# Patient Record
Sex: MALE | Race: WHITE | Employment: OTHER | URBAN - METROPOLITAN AREA
[De-identification: names, ages, dates, MRNs, and addresses within clinical notes are randomized per-mention and may not be internally consistent; named-entity substitution may affect disease eponyms.]

---

## 2017-04-12 PROBLEM — N28.89 RENAL MASS: Status: ACTIVE | Noted: 2017-04-12

## 2017-05-19 ENCOUNTER — ANESTHESIA EVENT (OUTPATIENT)
Dept: SURGERY | Age: 57
DRG: 657 | End: 2017-05-19
Payer: COMMERCIAL

## 2017-05-22 ENCOUNTER — ANESTHESIA (OUTPATIENT)
Dept: SURGERY | Age: 57
DRG: 657 | End: 2017-05-22
Payer: COMMERCIAL

## 2017-05-22 ENCOUNTER — HOSPITAL ENCOUNTER (INPATIENT)
Age: 57
LOS: 4 days | Discharge: HOME | DRG: 657 | End: 2017-05-26
Attending: UROLOGY | Admitting: UROLOGY
Payer: COMMERCIAL

## 2017-05-22 LAB
ABO + RH BLD: NORMAL
BLOOD GROUP ANTIBODIES SERPL: NORMAL
ERYTHROCYTE [DISTWIDTH] IN BLOOD BY AUTOMATED COUNT: 13.7 % (ref 11.6–14.5)
HCT VFR BLD AUTO: 46.9 % (ref 36–48)
HGB BLD-MCNC: 15.8 G/DL (ref 13–16)
MCH RBC QN AUTO: 29.2 PG (ref 24–34)
MCHC RBC AUTO-ENTMCNC: 33.7 G/DL (ref 31–37)
MCV RBC AUTO: 86.5 FL (ref 74–97)
PLATELET # BLD AUTO: 240 K/UL (ref 135–420)
PMV BLD AUTO: 9.8 FL (ref 9.2–11.8)
RBC # BLD AUTO: 5.42 M/UL (ref 4.7–5.5)
SPECIMEN EXP DATE BLD: NORMAL
WBC # BLD AUTO: 12.4 K/UL (ref 4.6–13.2)

## 2017-05-22 PROCEDURE — 0TT14ZZ RESECTION OF LEFT KIDNEY, PERCUTANEOUS ENDOSCOPIC APPROACH: ICD-10-PCS | Performed by: UROLOGY

## 2017-05-22 PROCEDURE — 76010000881 HC OR TIME 4.5 TO 5HR INTENSV - TIER 2: Performed by: UROLOGY

## 2017-05-22 PROCEDURE — 77030036732 HC RELD STPLR VASC J&J -F: Performed by: UROLOGY

## 2017-05-22 PROCEDURE — 77030010935 HC CLP LIG ABSRB TELE -B: Performed by: UROLOGY

## 2017-05-22 PROCEDURE — 74011250636 HC RX REV CODE- 250/636: Performed by: NURSE ANESTHETIST, CERTIFIED REGISTERED

## 2017-05-22 PROCEDURE — 85027 COMPLETE CBC AUTOMATED: CPT | Performed by: UROLOGY

## 2017-05-22 PROCEDURE — 77030010507 HC ADH SKN DERMBND J&J -B: Performed by: UROLOGY

## 2017-05-22 PROCEDURE — 77030032490 HC SLV COMPR SCD KNE COVD -B: Performed by: UROLOGY

## 2017-05-22 PROCEDURE — 74011250636 HC RX REV CODE- 250/636: Performed by: ANESTHESIOLOGY

## 2017-05-22 PROCEDURE — 74011000250 HC RX REV CODE- 250: Performed by: UROLOGY

## 2017-05-22 PROCEDURE — 77030007956 HC PCH ENDOSC SPEC COVD -C: Performed by: UROLOGY

## 2017-05-22 PROCEDURE — 0TJB8ZZ INSPECTION OF BLADDER, VIA NATURAL OR ARTIFICIAL OPENING ENDOSCOPIC: ICD-10-PCS | Performed by: UROLOGY

## 2017-05-22 PROCEDURE — 77030008462 HC STPLR SKN PROX J&J -A: Performed by: UROLOGY

## 2017-05-22 PROCEDURE — 77030031139 HC SUT VCRL2 J&J -A: Performed by: UROLOGY

## 2017-05-22 PROCEDURE — 77030010517 HC APPL SEAL FLOSEL BAXT -B: Performed by: UROLOGY

## 2017-05-22 PROCEDURE — 74011250637 HC RX REV CODE- 250/637

## 2017-05-22 PROCEDURE — 74011000258 HC RX REV CODE- 258: Performed by: UROLOGY

## 2017-05-22 PROCEDURE — 77030008683 HC TU ET CUF COVD -A: Performed by: ANESTHESIOLOGY

## 2017-05-22 PROCEDURE — 76060000040 HC ANESTHESIA 4.5 TO 5 HR: Performed by: UROLOGY

## 2017-05-22 PROCEDURE — 36415 COLL VENOUS BLD VENIPUNCTURE: CPT | Performed by: UROLOGY

## 2017-05-22 PROCEDURE — 77030008602 HC TRCR ENDOSC EPATH J&J -B: Performed by: UROLOGY

## 2017-05-22 PROCEDURE — 77030018842 HC SOL IRR SOD CL 9% BAXT -A: Performed by: UROLOGY

## 2017-05-22 PROCEDURE — 76210000016 HC OR PH I REC 1 TO 1.5 HR: Performed by: UROLOGY

## 2017-05-22 PROCEDURE — 74011250636 HC RX REV CODE- 250/636: Performed by: UROLOGY

## 2017-05-22 PROCEDURE — 88307 TISSUE EXAM BY PATHOLOGIST: CPT | Performed by: UROLOGY

## 2017-05-22 PROCEDURE — 74011250637 HC RX REV CODE- 250/637: Performed by: UROLOGY

## 2017-05-22 PROCEDURE — 77030002996 HC SUT SLK J&J -A: Performed by: UROLOGY

## 2017-05-22 PROCEDURE — 77030002896 HC SUT CLP ABSRB J&J -B: Performed by: UROLOGY

## 2017-05-22 PROCEDURE — 74011250636 HC RX REV CODE- 250/636

## 2017-05-22 PROCEDURE — 77030016151 HC PROTCTR LNS DFOG COVD -B: Performed by: UROLOGY

## 2017-05-22 PROCEDURE — 77030009848 HC PASSR SUT SET COOP -C: Performed by: UROLOGY

## 2017-05-22 PROCEDURE — 74011000250 HC RX REV CODE- 250

## 2017-05-22 PROCEDURE — 77030010939 HC CLP LIG TELE -B: Performed by: UROLOGY

## 2017-05-22 PROCEDURE — 77030008771 HC TU NG SALEM SUMP -A: Performed by: ANESTHESIOLOGY

## 2017-05-22 PROCEDURE — 77030035048 HC TRCR ENDOSC OPTCL COVD -B: Performed by: UROLOGY

## 2017-05-22 PROCEDURE — 77030002933 HC SUT MCRYL J&J -A: Performed by: UROLOGY

## 2017-05-22 PROCEDURE — 77030002966 HC SUT PDS J&J -A: Performed by: UROLOGY

## 2017-05-22 PROCEDURE — 86900 BLOOD TYPING SEROLOGIC ABO: CPT

## 2017-05-22 PROCEDURE — 77030019908 HC STETH ESOPH SIMS -A: Performed by: ANESTHESIOLOGY

## 2017-05-22 PROCEDURE — 77030013079 HC BLNKT BAIR HGGR 3M -A: Performed by: ANESTHESIOLOGY

## 2017-05-22 PROCEDURE — 77030011640 HC PAD GRND REM COVD -A: Performed by: UROLOGY

## 2017-05-22 PROCEDURE — 77030027138 HC INCENT SPIROMETER -A

## 2017-05-22 PROCEDURE — 77030012022 HC APPL CLP ENDOSC COVD -C: Performed by: UROLOGY

## 2017-05-22 PROCEDURE — 77030014647 HC SEAL FBRN TISSL BAXT -D: Performed by: UROLOGY

## 2017-05-22 PROCEDURE — 77030035488 HC SEAL UNIV DISP INTU -C: Performed by: UROLOGY

## 2017-05-22 PROCEDURE — 77030035277 HC OBTRTR BLDELSS DISP INTU -B: Performed by: UROLOGY

## 2017-05-22 PROCEDURE — 77030034850: Performed by: UROLOGY

## 2017-05-22 PROCEDURE — 65270000029 HC RM PRIVATE

## 2017-05-22 PROCEDURE — 77030020263 HC SOL INJ SOD CL0.9% LFCR 1000ML

## 2017-05-22 PROCEDURE — 77030021678 HC GLIDESCP STAT DISP VERT -B: Performed by: ANESTHESIOLOGY

## 2017-05-22 RX ORDER — HEPARIN SODIUM 5000 [USP'U]/ML
5000 INJECTION, SOLUTION INTRAVENOUS; SUBCUTANEOUS ONCE
Status: COMPLETED | OUTPATIENT
Start: 2017-05-22 | End: 2017-05-22

## 2017-05-22 RX ORDER — DOCUSATE SODIUM 100 MG/1
100 CAPSULE, LIQUID FILLED ORAL 3 TIMES DAILY
Status: DISCONTINUED | OUTPATIENT
Start: 2017-05-22 | End: 2017-05-26 | Stop reason: HOSPADM

## 2017-05-22 RX ORDER — SODIUM CHLORIDE, SODIUM LACTATE, POTASSIUM CHLORIDE, CALCIUM CHLORIDE 600; 310; 30; 20 MG/100ML; MG/100ML; MG/100ML; MG/100ML
50 INJECTION, SOLUTION INTRAVENOUS CONTINUOUS
Status: DISCONTINUED | OUTPATIENT
Start: 2017-05-22 | End: 2017-05-22 | Stop reason: HOSPADM

## 2017-05-22 RX ORDER — HYDROMORPHONE HYDROCHLORIDE 2 MG/ML
0.5 INJECTION, SOLUTION INTRAMUSCULAR; INTRAVENOUS; SUBCUTANEOUS
Status: DISCONTINUED | OUTPATIENT
Start: 2017-05-22 | End: 2017-05-22 | Stop reason: HOSPADM

## 2017-05-22 RX ORDER — SODIUM CHLORIDE, SODIUM LACTATE, POTASSIUM CHLORIDE, CALCIUM CHLORIDE 600; 310; 30; 20 MG/100ML; MG/100ML; MG/100ML; MG/100ML
INJECTION, SOLUTION INTRAVENOUS
Status: DISCONTINUED | OUTPATIENT
Start: 2017-05-22 | End: 2017-05-22 | Stop reason: HOSPADM

## 2017-05-22 RX ORDER — METOPROLOL TARTRATE 5 MG/5ML
INJECTION INTRAVENOUS AS NEEDED
Status: DISCONTINUED | OUTPATIENT
Start: 2017-05-22 | End: 2017-05-22 | Stop reason: HOSPADM

## 2017-05-22 RX ORDER — NALOXONE HYDROCHLORIDE 0.4 MG/ML
0.4 INJECTION, SOLUTION INTRAMUSCULAR; INTRAVENOUS; SUBCUTANEOUS AS NEEDED
Status: DISCONTINUED | OUTPATIENT
Start: 2017-05-22 | End: 2017-05-26 | Stop reason: HOSPADM

## 2017-05-22 RX ORDER — ROCURONIUM BROMIDE 10 MG/ML
INJECTION, SOLUTION INTRAVENOUS AS NEEDED
Status: DISCONTINUED | OUTPATIENT
Start: 2017-05-22 | End: 2017-05-22 | Stop reason: HOSPADM

## 2017-05-22 RX ORDER — SODIUM CHLORIDE 9 MG/ML
125 INJECTION, SOLUTION INTRAVENOUS CONTINUOUS
Status: DISCONTINUED | OUTPATIENT
Start: 2017-05-22 | End: 2017-05-25

## 2017-05-22 RX ORDER — PROPOFOL 10 MG/ML
INJECTION, EMULSION INTRAVENOUS AS NEEDED
Status: DISCONTINUED | OUTPATIENT
Start: 2017-05-22 | End: 2017-05-22 | Stop reason: HOSPADM

## 2017-05-22 RX ORDER — NEOSTIGMINE METHYLSULFATE 5 MG/5 ML
SYRINGE (ML) INTRAVENOUS AS NEEDED
Status: DISCONTINUED | OUTPATIENT
Start: 2017-05-22 | End: 2017-05-22 | Stop reason: HOSPADM

## 2017-05-22 RX ORDER — LABETALOL HCL 20 MG/4 ML
SYRINGE (ML) INTRAVENOUS AS NEEDED
Status: DISCONTINUED | OUTPATIENT
Start: 2017-05-22 | End: 2017-05-22 | Stop reason: HOSPADM

## 2017-05-22 RX ORDER — ACETAMINOPHEN 325 MG/1
650 TABLET ORAL
COMMUNITY
End: 2017-09-26

## 2017-05-22 RX ORDER — DOCUSATE SODIUM 100 MG/1
100 CAPSULE, LIQUID FILLED ORAL 2 TIMES DAILY
Qty: 30 CAP | Refills: 0 | Status: SHIPPED | OUTPATIENT
Start: 2017-05-22 | End: 2017-08-20

## 2017-05-22 RX ORDER — FENTANYL CITRATE 50 UG/ML
50 INJECTION, SOLUTION INTRAMUSCULAR; INTRAVENOUS AS NEEDED
Status: DISCONTINUED | OUTPATIENT
Start: 2017-05-22 | End: 2017-05-22 | Stop reason: HOSPADM

## 2017-05-22 RX ORDER — LIDOCAINE HYDROCHLORIDE 20 MG/ML
INJECTION, SOLUTION EPIDURAL; INFILTRATION; INTRACAUDAL; PERINEURAL AS NEEDED
Status: DISCONTINUED | OUTPATIENT
Start: 2017-05-22 | End: 2017-05-22 | Stop reason: HOSPADM

## 2017-05-22 RX ORDER — HEPARIN SODIUM 5000 [USP'U]/ML
5000 INJECTION, SOLUTION INTRAVENOUS; SUBCUTANEOUS EVERY 8 HOURS
Status: DISCONTINUED | OUTPATIENT
Start: 2017-05-22 | End: 2017-05-26 | Stop reason: HOSPADM

## 2017-05-22 RX ORDER — FAMOTIDINE 20 MG/1
TABLET, FILM COATED ORAL
Status: COMPLETED
Start: 2017-05-22 | End: 2017-05-22

## 2017-05-22 RX ORDER — ZOLPIDEM TARTRATE 5 MG/1
5 TABLET ORAL
Status: DISCONTINUED | OUTPATIENT
Start: 2017-05-22 | End: 2017-05-26 | Stop reason: HOSPADM

## 2017-05-22 RX ORDER — SODIUM CHLORIDE 0.9 % (FLUSH) 0.9 %
5-10 SYRINGE (ML) INJECTION AS NEEDED
Status: DISCONTINUED | OUTPATIENT
Start: 2017-05-22 | End: 2017-05-26 | Stop reason: HOSPADM

## 2017-05-22 RX ORDER — SODIUM CHLORIDE 0.9 % (FLUSH) 0.9 %
5-10 SYRINGE (ML) INJECTION EVERY 8 HOURS
Status: DISCONTINUED | OUTPATIENT
Start: 2017-05-22 | End: 2017-05-26 | Stop reason: HOSPADM

## 2017-05-22 RX ORDER — BUPIVACAINE HYDROCHLORIDE 2.5 MG/ML
INJECTION, SOLUTION EPIDURAL; INFILTRATION; INTRACAUDAL AS NEEDED
Status: DISCONTINUED | OUTPATIENT
Start: 2017-05-22 | End: 2017-05-22 | Stop reason: HOSPADM

## 2017-05-22 RX ORDER — SODIUM CHLORIDE 0.9 % (FLUSH) 0.9 %
5-10 SYRINGE (ML) INJECTION EVERY 8 HOURS
Status: DISCONTINUED | OUTPATIENT
Start: 2017-05-22 | End: 2017-05-22 | Stop reason: HOSPADM

## 2017-05-22 RX ORDER — FENTANYL CITRATE 50 UG/ML
INJECTION, SOLUTION INTRAMUSCULAR; INTRAVENOUS AS NEEDED
Status: DISCONTINUED | OUTPATIENT
Start: 2017-05-22 | End: 2017-05-22 | Stop reason: HOSPADM

## 2017-05-22 RX ORDER — OXYCODONE AND ACETAMINOPHEN 5; 325 MG/1; MG/1
1-2 TABLET ORAL
Qty: 30 TAB | Refills: 0 | Status: SHIPPED | OUTPATIENT
Start: 2017-05-22 | End: 2017-09-26

## 2017-05-22 RX ORDER — SODIUM CHLORIDE, SODIUM LACTATE, POTASSIUM CHLORIDE, CALCIUM CHLORIDE 600; 310; 30; 20 MG/100ML; MG/100ML; MG/100ML; MG/100ML
50 INJECTION, SOLUTION INTRAVENOUS CONTINUOUS
Status: DISCONTINUED | OUTPATIENT
Start: 2017-05-23 | End: 2017-05-22 | Stop reason: HOSPADM

## 2017-05-22 RX ORDER — FAMOTIDINE 20 MG/1
20 TABLET, FILM COATED ORAL ONCE
Status: COMPLETED | OUTPATIENT
Start: 2017-05-23 | End: 2017-05-22

## 2017-05-22 RX ORDER — HYDROMORPHONE HYDROCHLORIDE 1 MG/ML
INJECTION, SOLUTION INTRAMUSCULAR; INTRAVENOUS; SUBCUTANEOUS AS NEEDED
Status: DISCONTINUED | OUTPATIENT
Start: 2017-05-22 | End: 2017-05-22 | Stop reason: HOSPADM

## 2017-05-22 RX ORDER — ONDANSETRON 2 MG/ML
INJECTION INTRAMUSCULAR; INTRAVENOUS AS NEEDED
Status: DISCONTINUED | OUTPATIENT
Start: 2017-05-22 | End: 2017-05-22 | Stop reason: HOSPADM

## 2017-05-22 RX ORDER — ONDANSETRON 2 MG/ML
4 INJECTION INTRAMUSCULAR; INTRAVENOUS ONCE
Status: DISCONTINUED | OUTPATIENT
Start: 2017-05-22 | End: 2017-05-22 | Stop reason: HOSPADM

## 2017-05-22 RX ORDER — SUCCINYLCHOLINE CHLORIDE 20 MG/ML
INJECTION INTRAMUSCULAR; INTRAVENOUS AS NEEDED
Status: DISCONTINUED | OUTPATIENT
Start: 2017-05-22 | End: 2017-05-22 | Stop reason: HOSPADM

## 2017-05-22 RX ORDER — ACETAMINOPHEN 325 MG/1
650 TABLET ORAL
Status: DISCONTINUED | OUTPATIENT
Start: 2017-05-22 | End: 2017-05-26 | Stop reason: HOSPADM

## 2017-05-22 RX ORDER — DIPHENHYDRAMINE HYDROCHLORIDE 50 MG/ML
12.5 INJECTION, SOLUTION INTRAMUSCULAR; INTRAVENOUS
Status: DISCONTINUED | OUTPATIENT
Start: 2017-05-22 | End: 2017-05-26 | Stop reason: HOSPADM

## 2017-05-22 RX ORDER — MORPHINE SULFATE 10 MG/ML
4 INJECTION, SOLUTION INTRAMUSCULAR; INTRAVENOUS
Status: DISCONTINUED | OUTPATIENT
Start: 2017-05-22 | End: 2017-05-26 | Stop reason: HOSPADM

## 2017-05-22 RX ORDER — ESMOLOL HYDROCHLORIDE 10 MG/ML
INJECTION INTRAVENOUS AS NEEDED
Status: DISCONTINUED | OUTPATIENT
Start: 2017-05-22 | End: 2017-05-22 | Stop reason: HOSPADM

## 2017-05-22 RX ORDER — SODIUM CHLORIDE 0.9 % (FLUSH) 0.9 %
5-10 SYRINGE (ML) INJECTION AS NEEDED
Status: DISCONTINUED | OUTPATIENT
Start: 2017-05-22 | End: 2017-05-22 | Stop reason: HOSPADM

## 2017-05-22 RX ORDER — ONDANSETRON 2 MG/ML
4 INJECTION INTRAMUSCULAR; INTRAVENOUS
Status: COMPLETED | OUTPATIENT
Start: 2017-05-22 | End: 2017-05-22

## 2017-05-22 RX ORDER — GLYCOPYRROLATE 0.2 MG/ML
INJECTION INTRAMUSCULAR; INTRAVENOUS AS NEEDED
Status: DISCONTINUED | OUTPATIENT
Start: 2017-05-22 | End: 2017-05-22 | Stop reason: HOSPADM

## 2017-05-22 RX ORDER — ONDANSETRON 2 MG/ML
4 INJECTION INTRAMUSCULAR; INTRAVENOUS
Status: DISCONTINUED | OUTPATIENT
Start: 2017-05-22 | End: 2017-05-26 | Stop reason: HOSPADM

## 2017-05-22 RX ORDER — OXYCODONE AND ACETAMINOPHEN 5; 325 MG/1; MG/1
1-2 TABLET ORAL
Status: DISCONTINUED | OUTPATIENT
Start: 2017-05-22 | End: 2017-05-26 | Stop reason: HOSPADM

## 2017-05-22 RX ORDER — MIDAZOLAM HYDROCHLORIDE 1 MG/ML
INJECTION, SOLUTION INTRAMUSCULAR; INTRAVENOUS AS NEEDED
Status: DISCONTINUED | OUTPATIENT
Start: 2017-05-22 | End: 2017-05-22 | Stop reason: HOSPADM

## 2017-05-22 RX ADMIN — ROCURONIUM BROMIDE 10 MG: 10 INJECTION, SOLUTION INTRAVENOUS at 10:21

## 2017-05-22 RX ADMIN — FENTANYL CITRATE 25 MCG: 50 INJECTION, SOLUTION INTRAMUSCULAR; INTRAVENOUS at 12:05

## 2017-05-22 RX ADMIN — SODIUM CHLORIDE, SODIUM LACTATE, POTASSIUM CHLORIDE, AND CALCIUM CHLORIDE: 600; 310; 30; 20 INJECTION, SOLUTION INTRAVENOUS at 10:47

## 2017-05-22 RX ADMIN — FENTANYL CITRATE 50 MCG: 50 INJECTION, SOLUTION INTRAMUSCULAR; INTRAVENOUS at 08:55

## 2017-05-22 RX ADMIN — MIDAZOLAM HYDROCHLORIDE 2 MG: 1 INJECTION, SOLUTION INTRAMUSCULAR; INTRAVENOUS at 07:40

## 2017-05-22 RX ADMIN — ESMOLOL HYDROCHLORIDE 10 MG: 10 INJECTION INTRAVENOUS at 08:59

## 2017-05-22 RX ADMIN — DOCUSATE SODIUM 100 MG: 100 CAPSULE, LIQUID FILLED ORAL at 15:15

## 2017-05-22 RX ADMIN — HEPARIN SODIUM 5000 UNITS: 5000 INJECTION, SOLUTION INTRAVENOUS; SUBCUTANEOUS at 14:54

## 2017-05-22 RX ADMIN — GLYCOPYRROLATE 0.4 MG: 0.2 INJECTION INTRAMUSCULAR; INTRAVENOUS at 11:54

## 2017-05-22 RX ADMIN — ROCURONIUM BROMIDE 10 MG: 10 INJECTION, SOLUTION INTRAVENOUS at 10:05

## 2017-05-22 RX ADMIN — ROCURONIUM BROMIDE 10 MG: 10 INJECTION, SOLUTION INTRAVENOUS at 10:35

## 2017-05-22 RX ADMIN — METOPROLOL TARTRATE 1 MG: 5 INJECTION INTRAVENOUS at 09:07

## 2017-05-22 RX ADMIN — FENTANYL CITRATE 50 MCG: 50 INJECTION, SOLUTION INTRAMUSCULAR; INTRAVENOUS at 08:15

## 2017-05-22 RX ADMIN — DOCUSATE SODIUM 100 MG: 100 CAPSULE, LIQUID FILLED ORAL at 22:14

## 2017-05-22 RX ADMIN — SODIUM CHLORIDE 900 MG: 900 INJECTION, SOLUTION INTRAVENOUS at 07:46

## 2017-05-22 RX ADMIN — ONDANSETRON 4 MG: 2 INJECTION INTRAMUSCULAR; INTRAVENOUS at 11:04

## 2017-05-22 RX ADMIN — FAMOTIDINE 20 MG: 20 TABLET, FILM COATED ORAL at 07:26

## 2017-05-22 RX ADMIN — HYDROMORPHONE HYDROCHLORIDE 1 MG: 1 INJECTION, SOLUTION INTRAMUSCULAR; INTRAVENOUS; SUBCUTANEOUS at 08:37

## 2017-05-22 RX ADMIN — ESMOLOL HYDROCHLORIDE 10 MG: 10 INJECTION INTRAVENOUS at 08:58

## 2017-05-22 RX ADMIN — FAMOTIDINE 20 MG: 20 TABLET ORAL at 07:26

## 2017-05-22 RX ADMIN — HYDROMORPHONE HYDROCHLORIDE 0.4 MG: 1 INJECTION, SOLUTION INTRAMUSCULAR; INTRAVENOUS; SUBCUTANEOUS at 09:47

## 2017-05-22 RX ADMIN — ROCURONIUM BROMIDE 20 MG: 10 INJECTION, SOLUTION INTRAVENOUS at 08:15

## 2017-05-22 RX ADMIN — ROCURONIUM BROMIDE 5 MG: 10 INJECTION, SOLUTION INTRAVENOUS at 07:46

## 2017-05-22 RX ADMIN — SUCCINYLCHOLINE CHLORIDE 200 MG: 20 INJECTION INTRAMUSCULAR; INTRAVENOUS at 07:46

## 2017-05-22 RX ADMIN — ROCURONIUM BROMIDE 35 MG: 10 INJECTION, SOLUTION INTRAVENOUS at 07:50

## 2017-05-22 RX ADMIN — FENTANYL CITRATE 100 MCG: 50 INJECTION, SOLUTION INTRAMUSCULAR; INTRAVENOUS at 09:03

## 2017-05-22 RX ADMIN — CEFAZOLIN SODIUM 1 G: 1 INJECTION, POWDER, FOR SOLUTION INTRAMUSCULAR; INTRAVENOUS at 14:54

## 2017-05-22 RX ADMIN — ROCURONIUM BROMIDE 5 MG: 10 INJECTION, SOLUTION INTRAVENOUS at 11:10

## 2017-05-22 RX ADMIN — MORPHINE SULFATE 4 MG: 10 INJECTION INTRAMUSCULAR; INTRAVENOUS; SUBCUTANEOUS at 15:15

## 2017-05-22 RX ADMIN — ROCURONIUM BROMIDE 10 MG: 10 INJECTION, SOLUTION INTRAVENOUS at 09:46

## 2017-05-22 RX ADMIN — FENTANYL CITRATE 50 MCG: 50 INJECTION, SOLUTION INTRAMUSCULAR; INTRAVENOUS at 13:18

## 2017-05-22 RX ADMIN — SODIUM CHLORIDE 125 ML/HR: 900 INJECTION, SOLUTION INTRAVENOUS at 14:53

## 2017-05-22 RX ADMIN — FENTANYL CITRATE 100 MCG: 50 INJECTION, SOLUTION INTRAMUSCULAR; INTRAVENOUS at 07:46

## 2017-05-22 RX ADMIN — Medication 5 MG: at 09:54

## 2017-05-22 RX ADMIN — HEPARIN SODIUM 5000 UNITS: 5000 INJECTION, SOLUTION INTRAVENOUS; SUBCUTANEOUS at 07:35

## 2017-05-22 RX ADMIN — ROCURONIUM BROMIDE 10 MG: 10 INJECTION, SOLUTION INTRAVENOUS at 09:36

## 2017-05-22 RX ADMIN — PROPOFOL 200 MG: 10 INJECTION, EMULSION INTRAVENOUS at 07:46

## 2017-05-22 RX ADMIN — ONDANSETRON 4 MG: 2 INJECTION INTRAMUSCULAR; INTRAVENOUS at 07:23

## 2017-05-22 RX ADMIN — SODIUM CHLORIDE, SODIUM LACTATE, POTASSIUM CHLORIDE, CALCIUM CHLORIDE: 600; 310; 30; 20 INJECTION, SOLUTION INTRAVENOUS at 08:30

## 2017-05-22 RX ADMIN — LIDOCAINE HYDROCHLORIDE 50 MG: 20 INJECTION, SOLUTION EPIDURAL; INFILTRATION; INTRACAUDAL; PERINEURAL at 07:46

## 2017-05-22 RX ADMIN — ROCURONIUM BROMIDE 20 MG: 10 INJECTION, SOLUTION INTRAVENOUS at 08:00

## 2017-05-22 RX ADMIN — ROCURONIUM BROMIDE 10 MG: 10 INJECTION, SOLUTION INTRAVENOUS at 08:35

## 2017-05-22 RX ADMIN — SODIUM CHLORIDE, SODIUM LACTATE, POTASSIUM CHLORIDE, AND CALCIUM CHLORIDE 50 ML/HR: 600; 310; 30; 20 INJECTION, SOLUTION INTRAVENOUS at 07:20

## 2017-05-22 RX ADMIN — ROCURONIUM BROMIDE 5 MG: 10 INJECTION, SOLUTION INTRAVENOUS at 11:02

## 2017-05-22 RX ADMIN — OXYCODONE HYDROCHLORIDE AND ACETAMINOPHEN 1 TABLET: 5; 325 TABLET ORAL at 18:08

## 2017-05-22 RX ADMIN — Medication 3 MG: at 11:54

## 2017-05-22 RX ADMIN — ROCURONIUM BROMIDE 10 MG: 10 INJECTION, SOLUTION INTRAVENOUS at 09:05

## 2017-05-22 RX ADMIN — PROPOFOL 90 MG: 10 INJECTION, EMULSION INTRAVENOUS at 07:47

## 2017-05-22 RX ADMIN — FENTANYL CITRATE 50 MCG: 50 INJECTION, SOLUTION INTRAMUSCULAR; INTRAVENOUS at 12:58

## 2017-05-22 RX ADMIN — ROCURONIUM BROMIDE 10 MG: 10 INJECTION, SOLUTION INTRAVENOUS at 07:55

## 2017-05-22 RX ADMIN — ONDANSETRON 4 MG: 2 INJECTION INTRAMUSCULAR; INTRAVENOUS at 08:34

## 2017-05-22 RX ADMIN — CEFAZOLIN SODIUM 1 G: 1 INJECTION, POWDER, FOR SOLUTION INTRAMUSCULAR; INTRAVENOUS at 22:14

## 2017-05-22 RX ADMIN — FENTANYL CITRATE 50 MCG: 50 INJECTION, SOLUTION INTRAMUSCULAR; INTRAVENOUS at 07:50

## 2017-05-22 RX ADMIN — ROCURONIUM BROMIDE 10 MG: 10 INJECTION, SOLUTION INTRAVENOUS at 11:15

## 2017-05-22 RX ADMIN — FENTANYL CITRATE 25 MCG: 50 INJECTION, SOLUTION INTRAMUSCULAR; INTRAVENOUS at 12:06

## 2017-05-22 NOTE — PROGRESS NOTES
conducted an initial consultation and Spiritual Assessment for Rodrigo Gaming, who is a 49 y.o.,male. Patients Primary Language is: [place]. According to the patients EMR Mandaen Affiliation is: No preference. 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 Explored issues of macho, belief, spirituality and Quaker/ritual needs while hospitalized. Listened empathically. Provided information about Spiritual Care Services. Offered prayer and assurance of continued prayers on patient's behalf. Chart reviewed. The following outcomes were achieved:  Patient shared limited information about both their medical narrative and spiritual journey/beliefs. Patient processed feeling about current hospitalization. Patient expressed gratitude for 's visit. Assessment:  Patient does not have any Quaker/cultural needs that will affect patients preferences in health care. There are no further spiritual or Quaker issues which require intervention at this time. Plan:  Chaplains will continue to follow and will provide pastoral care on an as needed/requested basis.  recommends bedside caregivers page  on duty if patient shows signs of acute spiritual or emotional distress. No Moya M.Div.   [place] [address]  [phone]

## 2017-05-22 NOTE — ROUTINE PROCESS
TRANSFER - IN REPORT:    Verbal report received from [facility]) on Severo Senegal  being received from PACU(unit) for routine post - op      Report consisted of patients Situation, Background, Assessment and   Recommendations(SBAR). Information from the following report(s) SBAR, Kardex, OR Summary, Intake/Output, MAR and Recent Results was reviewed with the receiving nurse. Opportunity for questions and clarification was provided. Assessment completed upon patients arrival to unit and care assumed.

## 2017-05-22 NOTE — PROGRESS NOTES
1220-Called to PACU to set up bipap for pt   -when arrived pt on simple mask - sat=95% & beginning to awaken as expected  -at this time bipap does not seem needed as pt is waking up & answering questions when asked = not using home cpap or O2  -discussed with RN & she will continue to monitor & if has trouble will leave bipap on standby

## 2017-05-22 NOTE — PROGRESS NOTES
Patient arrived in room 2214 on bed with nurse,pt is alert/oriented X 4,pt is on oxygen @ 2 liters, PIV c/d/i, incision c/d/i, pt is complaining of pain on his right buttocks,makes him situated in bed, bed at the lowest position,call bell explained and within reach, white board filled out. 1430 Patient's assessment done,skin assessment done, skin intact except incision, no drainage, no bleeding, no swelling, incision c/d/i, IVF running @ 125 ml/hr. 1515 Patient having pain, offered pain med, pain med given, pt resting, wife at bedside. 1700 Patient sleeping, call bell within reach, bed at the lowest position, wife at bedside. 1800 Patient feeling warm, temperature is normal, offered Percocet, will give shortly. Bedside and Verbal shift change report given to Larisa Lockett (oncoming nurse) by Tadeo Cordero (offgoing nurse). Report included the following information SBAR, Kardex, OR Summary, Intake/Output, MAR and Recent Results.

## 2017-05-22 NOTE — IP AVS SNAPSHOT
Meron Patel 
 
 
 [address] [place] [address] Patient: Davi Wiggins MRN: [id number] L[employer]:[birth year] You are allergic to the following Allergen Reactions Codeine Other (comments)  
 nightmares Recent Documentation Height Weight BMI Smoking Status 1.981 m (!) 186.9 kg 47.61 kg/m2 Never Smoker Emergency Contacts Name Discharge Info Relation Home Work Mobile Lisbeth Joseph [address] CAREGIVER [3] Spouse [3] [phone] About your hospitalization You were admitted on:  May 22, 2017 You last received care in the:  [address] [place] [address],[address] You were discharged on:  May 26, 2017 Unit phone number:  [phone] Why you were hospitalized Your primary diagnosis was:  Not on File Your diagnoses also included:  Renal Mass Providers Seen During Your Hospitalizations Provider Role Specialty Primary office phone Maryann Espinoza MD Attending Provider Urology [phone] Your Primary Care Physician (PCP) Primary Care Physician Office Phone Office Fax [address] [phone] Follow-up Information Follow up With Details Comments Contact Info Marie Dockery MD   [facility] [place] [address] 
[phone] Your Appointments Tuesday June 20, 2017  2:30 PM EDT  
ESTABLISHED PATIENT with Maryann Espinoza MD  
[facility] [address] [facility] [address] [facility] [address]  
[phone] Current Discharge Medication List  
  
START taking these medications Dose & Instructions Dispensing Information Comments Morning Noon Evening Bedtime  
 docusate sodium 100 mg capsule Commonly known as:  Major John Your last dose was:     
   
Your next dose is:    
   
   
 Dose:  100 mg  
 Take 1 Cap by mouth two (2) times a day for 90 days. Quantity:  30 Cap Refills:  0  
     
   
   
   
  
 oxyCODONE-acetaminophen 5-325 mg per tablet Commonly known as:  PERCOCET Your last dose was: Your next dose is:    
   
   
 Dose:  1-2 Tab Take 1-2 Tabs by mouth every four (4) hours as needed for Pain. Max Daily Amount: 12 Tabs. Quantity:  30 Tab Refills:  0 ASK your doctor about these medications Dose & Instructions Dispensing Information Comments Morning Noon Evening Bedtime TYLENOL 325 mg tablet Generic drug:  acetaminophen Your last dose was: Your next dose is:    
   
   
 Dose:  650 mg  
650 mg every four (4) hours as needed for Pain. Refills:  0 Where to Get Your Medications Information on where to get these meds will be given to you by the nurse or doctor. ! Ask your nurse or doctor about these medications  
  docusate sodium 100 mg capsule  
 oxyCODONE-acetaminophen 5-325 mg per tablet Discharge Instructions DISCHARGE SUMMARY from Nurse The following personal items are in your possession at time of discharge: 
 
Dental Appliances: None Visual Aid: None Home Medications: None Jewelry: None Clothing: Shirt, Socks, Undergarments, Footwear, Pants Other Valuables: None PATIENT INSTRUCTIONS: 
 
 
F-face looks uneven A-arms unable to move or move unevenly S-speech slurred or non-existent T-time-call 911 as soon as signs and symptoms begin-DO NOT go Back to bed or wait to see if you get better-TIME IS BRAIN.  
 
Warning Signs of HEART ATTACK  
 
 Call 911 if you have these symptoms: 
? Chest discomfort. Most heart attacks involve discomfort in the center of the chest that lasts more than a few minutes, or that goes away and comes back. It can feel like uncomfortable pressure, squeezing, fullness, or pain. ? Discomfort in other areas of the upper body. Symptoms can include pain or discomfort in one or both arms, the back, neck, jaw, or stomach. ? Shortness of breath with or without chest discomfort. ? Other signs may include breaking out in a cold sweat, nausea, or lightheadedness. Don't wait more than five minutes to call [address]! Fast action can save your life. Calling 911 is almost always the fastest way to get lifesaving treatment. Emergency Medical Services staff can begin treatment when they arrive  up to an hour sooner than if someone gets to the hospital by car. The discharge information has been reviewed with the patient. The patient verbalized understanding. Discharge medications reviewed with the patient and appropriate educational materials and side effects teaching were provided. Patient armband removed and shredded. [employer] Activation Thank you for requesting access to [employer]. Please follow the instructions below to securely access and download your online medical record. [employer] allows you to send messages to your doctor, view your test results, renew your prescriptions, schedule appointments, and more. How Do I Sign Up? 1. In your internet browser, go to [email or web address] 
2. Click on the First Time User? Click Here link in the Sign In box. You will be redirect to the New Member Sign Up page. 3. Enter your [employer] Access Code exactly as it appears below. You will not need to use this code after youve completed the sign-up process. If you do not sign up before the expiration date, you must request a new code.  
 
[employer] Access Code: [email or web address] 
 Expires: 2017 12:02 PM (This is the date your LinkCycle access code will ) 4. Enter the last four digits of your Social Security Number (xxxx) and Date of Birth (mm/dd/yyyy) as indicated and click Submit. You will be taken to the next sign-up page. 5. Create a LinkCycle ID. This will be your LinkCycle login ID and cannot be changed, so think of one that is secure and easy to remember. 6. Create a LinkCycle password. You can change your password at any time. 7. Enter your Password Reset Question and Answer. This can be used at a later time if you forget your password. 8. Enter your e-mail address. You will receive e-mail notification when new information is available in [address] 9. Click Sign Up. You can now view and download portions of your medical record. 10. Click the Download Summary menu link to download a portable copy of your medical information. Additional Information If you have questions, please visit the Frequently Asked Questions section of the LinkCycle website at [email or web address] Remember, LinkCycle is NOT to be used for urgent needs. For medical emergencies, dial 911. Discharge Instructions Attachments/References NEPHRECTOMY: LAPAROSCOPIC: POST-OP (ENGLISH) Discharge Orders None LinkCycle Announcement We are excited to announce that we are making your provider's discharge notes available to you in LinkCycle. You will see these notes when they are completed and signed by the physician that discharged you from your recent hospital stay. If you have any questions or concerns about any information you see in LinkCycle, please call the Health Information Department where you were seen or reach out to your Primary Care Provider for more information about your plan of care. Introducing [facility]!    
 Jacquie Ponce introduces LinkCycle patient portal. Now you can access parts of your medical record, email your doctor's office, and request medication refills online. 1. In your internet browser, go to [email or web address] [email or web address] 2. Click on the First Time User? Click Here link in the Sign In box. You will see the New Member Sign Up page. 3. Enter your [employer] Access Code exactly as it appears below. You will not need to use this code after youve completed the sign-up process. If you do not sign up before the expiration date, you must request a new code. · [employer] Access Code: [email or web address] Expires: 7/11/2017 12:02 PM 
 
4. Enter the last four digits of your Social Security Number (xxxx) and Date of Birth (mm/dd/yyyy) as indicated and click Submit. You will be taken to the next sign-up page. 5. Create a Thinkfuse ID. This will be your Thinkfuse login ID and cannot be changed, so think of one that is secure and easy to remember. 6. Create a Think[employer] password. You can change your password at any time. 7. Enter your Password Reset Question and Answer. This can be used at a later time if you forget your password. 8. Enter your e-mail address. You will receive e-mail notification when new information is available in [address] 9. Click Sign Up. You can now view and download portions of your medical record. 10. Click the Download Summary menu link to download a portable copy of your medical information. If you have questions, please visit the Frequently Asked Questions section of the [employer] website. Remember, [employer] is NOT to be used for urgent needs. For medical emergencies, dial 911. Now available from your iPhone and Android! General Information Please provide this summary of care documentation to your next provider. Patient Signature:  ____________________________________________________________ Date:  ____________________________________________________________  
  
[facility]  Provider Signature: ____________________________________________________________ Date:  ____________________________________________________________ More Information Laparoscopic Nephrectomy: What to Expect at [facility] Your Recovery A nephrectomy is surgery to take out part or all of the kidney. One or both kidneys may be taken out. Sometimes other tissue near the kidney is taken out at the same time. Your belly will feel sore after the surgery. This usually lasts about 1 to 2 weeks. Your doctor will give you pain medicine for this. You may also have other symptoms such as nausea, diarrhea, constipation, gas, or a headache. At first, you may have low energy and get tired quickly. It may take 3 to 6 months for your energy to fully return. Your body can work fine with one healthy kidney. If both kidneys are removed or your remaining kidney is not healthy, your doctor will talk to you about the kind of treatment you will need after surgery. This care sheet gives you a general idea about how long it will take for you to recover. But each person recovers at a different pace. Follow the steps below to get better as quickly as possible. How can you care for yourself at home? Activity · Rest when you feel tired. Getting enough sleep will help you recover. · Try to walk each day. Start by walking a little more than you did the day before. Bit by bit, increase the amount you walk. Walking boosts blood flow and helps prevent pneumonia and constipation. · Avoid exercises that use your belly muscles and strenuous activities such as bicycle riding, jogging, weight lifting, or aerobic exercise until your doctor says it is okay. · For at least 4 weeks, avoid lifting anything that would make you strain. This may include a child, heavy grocery bags and milk containers, a heavy briefcase or backpack, cat litter or dog food bags, or a vacuum .  
· Hold a pillow over the cuts the doctor made (incisions) when you cough or take deep breaths. This will support your belly and decrease your pain. · Do breathing exercises at home as instructed by your doctor. This will help prevent pneumonia. · Ask your doctor when you can drive again. · You will probably need to take 4 to 6 weeks off from work. It depends on the type of work you do and how you feel. · You may be able to take showers (unless you have a drainage tube near your incisions). If you have a drainage tube, follow your doctor's instructions to empty and care for it. Do not take a bath for the first 2 weeks, or until your doctor tells you it is okay. · Ask your doctor when it is okay for you to have sex. Diet · You can eat your normal diet. If you were on a special diet for your kidneys before surgery, follow that diet until your doctor tells you to stop. · If your stomach is upset, try bland, low-fat foods like plain rice, broiled chicken, toast, and yogurt. · Drink plenty of fluids (unless your doctor tells you not to). · You may notice that your bowel movements are not regular right after your surgery. This is common. Try to avoid constipation and straining with bowel movements. You may want to take a fiber supplement every day. If you have not had a bowel movement after a couple of days, ask your doctor about taking a mild laxative. Medicines · Your doctor will tell you if and when you can restart your medicines. He or she will also give you instructions about taking any new medicines. · If you take blood thinners, such as warfarin (Coumadin), clopidogrel (Plavix), or aspirin, be sure to talk to your doctor. He or she will tell you if and when to start taking those medicines again. Make sure that you understand exactly what your doctor wants you to do. · Take pain medicines exactly as directed. ¨ If the doctor gave you a prescription medicine for pain, take it as prescribed.  
¨ If you are not taking a prescription pain medicine, take an over-the-counter medicine that your doctor recommends. Read and follow all instructions on the label. ¨ Do not take aspirin, ibuprofen (Advil, Motrin), or naproxen (Aleve), or other nonsteroidal anti-inflammatory drugs (NSAIDs) unless your doctor says it is okay. · If you think your pain medicine is making you sick to your stomach: 
¨ Take your medicine after meals (unless your doctor has told you not to). ¨ Ask your doctor for a different pain medicine. · If your doctor prescribed antibiotics, take them as directed. Do not stop taking them just because you feel better. You need to take the full course of antibiotics. Incision care · If you have strips of tape on the incisions, leave the tape on for a week or until it falls off. · Wash the area around the incisions daily with warm, soapy water and pat it dry. Don't use hydrogen peroxide or alcohol, which can slow healing. You may cover the incisions with gauze bandages if they weep or rub against clothing. Change the bandages every day. · Keep the area around the incisions clean and dry. Follow-up care is a key part of your treatment and safety. Be sure to make and go to all appointments, and call your doctor if you are having problems. It's also a good idea to know your test results and keep a list of the medicines you take. When should you call for help? Call 911 anytime you think you may need emergency care. For example, call if: 
· You passed out (lost consciousness). · You have severe trouble breathing. · You have sudden chest pain and shortness of breath, or you cough up blood. · You have severe pain in your belly. Call your doctor now or seek immediate medical care if: 
· You have pain that does not get better after you take your pain medicine. · You have symptoms of a urinary infection. For example: ¨ You have blood or pus in your urine. ¨ You have pain in your back just below your rib cage. This is called flank pain. ¨ You have a fever, chills, or body aches. ¨ It hurts to urinate. ¨ You have groin or belly pain. · You have signs of infection, such as: 
¨ Increased pain, swelling, warmth, or redness. ¨ Red streaks leading from the incisions. ¨ Pus draining from the incisions. ¨ Swollen lymph nodes in your neck, armpits, or groin. ¨ A fever. · You have loose stitches, or your incisions come open. · You are bleeding from the incisions. · You have trouble passing urine or stool, especially if you have pain or swelling in your lower belly. Watch closely for changes in your health, and be sure to contact your doctor if: 
· You do not have a bowel movement after taking a laxative. Where can you learn more? Go to http://fortunato-linda.info/. Enter [id number] in the search box to learn more about \"Laparoscopic Nephrectomy: What to Expect at Home. \" Current as of: November 20, 2015 Content Version: 11.2 © 5919-8033 [employer]. Care instructions adapted under license by [employer] (which disclaims liability or warranty for this information). If you have questions about a medical condition or this instruction, always ask your healthcare professional. [place] [address] any warranty or liability for your use of this information.

## 2017-05-22 NOTE — PERIOP NOTES
[phone]  Patient received in PACU and connected to monitors. Vital signs stable. RN at bedside. CRNA concerned re pt's breathing. Lungs CTA. Will consult with RT and continue to monitor. Yamile Grossman, RT here. Will leave pt off CPAP at this time and reassess when pt wakes up a bit more. 0133  Nasal trumpet removed, pt on 4L/NC. Pt c/o pain at LLQ of abdomen where largest incision is located. Medicated per MAR.    2514  Adjusted pt several times, now with R knee elevated to relieve pressure on R hip. Pt resting with eyes closed, maintaining O2 sat 90-95% on 5L/NC.    1313  Spoke to pt's wife via phone in waiting area re update and plan of care. 5 [facility], MARY Alonzo will call back when available. [phone]  Pt c/o \"Right hip really hurts\" after arriving in room # 2214. Explained to pt re positioning during surgery and suggested assisting pt to turn to L side to alleviate hip pain. When assisting pt, pt states, \"I'm just going to have to deal with the hip pain for now\". MARY Alonzo at bedside, will assume care at this time.

## 2017-05-22 NOTE — H&P
History and physical review. The patient has been examined. There have been no significant clinical changes. NAD, CTAB, RRR    Left Side marked  Ancef On call to OR  Consented  Plan to proceed with Left Robotic Assisted Nephrectomy          Lorena Poon   1960  49 y.o.  2017            Encounter Diagnoses       ICD-10-CM ICD-9-CM   1. Renal mass N28.89 593.9   2. Morbid obesity, unspecified obesity type E66.01 278.01         ASSESSMENT:  1. Pt is a 49 y.o. male with 8 cm left renal mass concerning for RCC     PLAN:  Reviewed Renal Scan   All pre-op labs/imaging obtained and reviewed. Medical clearance reviewed  Echo completed  All risks and benefits dicussed  Return for scheduled Left robotic nephrectomy, possible open  Ecouraged diet and exercise for morbid obesity     DISCUSSION:     We discussed the risks and benefits of laparoscopic nephrectomy including, but not limited to, infection, bleeding, blood transfusion, possible conversion to open nephrectomy, possible injury to surrounding organs requiring immediate or delayed repair, possible benign path or positive surgical margins, chronic kidney disease with subsequent increased risk of cardiovascular morbidity and mortality, and global anesthesia risks including but not limited to CVA, MI, DVT, PE, pneumonia, and death. The patient expressed understanding and desire to proceed.             Chief Complaint   Patient presents with    Other       HW for left roboitc nephrectomy         Subjective:   Lorena Poon is a 49 y.o.  male who is seen in follow up for a HW for his left renal mass. Initially pt seen following an ED visit for abdominal pain and Diverticulitis. He completed both a Renal US and CT A/P identifying a 8 cm left sided renal mass. Last visit pt opted for surgical intervention for his mass.      He returns to complete his HW prior to his left robotic nephrectomy.    Renal function, clearance of both kidneys. Chest x-ray negative for acute process. EKG shows probable right ventricular hypertrophy. As part of his cardiac clearance a Echocardiogram was completed. Findings confirm mild left ventricular hypertrophy. LVEF: 60%. Overall, normal global left ventricular systolic function.     Pt remains interested in moving forward w/ his surgical procedure.      Creatinine: 1.1 on 5/2/2017     Radiology:     Echocardiogram 5/17/2017  FINDINGS:  Left Ventricle: Normal left ventricular cavity size.  Mild concentric left ventricular hypertrophy. Left Ventricle Normal global left ventricular systolic function. Mild diastolic dysfunction. Function:  LVEF: 60 %     Renal Flow/Function 5/12/2017  IMPRESSION:  Normal symmetric tracer uptake and clearance by both kidneys.     Chest x-ray 5/2/2017  IMPRESSION:   No acute process.     EKG 5/2/2017  Heart Rate 107            AZ Interval 140      QRSD Interval 104      QT Interval 352      QTC Interval 470      P Axis 43      QRS Axis 105      T Wave Axis 26      EKG Severity - ABNORMAL ECG -      EKG Impression SINUS TACHYCARDIA      EKG Impression PROBABLE RIGHT VENTRICULAR HYPERTROPHY           CT A/P W Cont 4/7/2017  IMPRESSION:     1.  Large mass arising from the posterior left kidney, dimensions as above, almost certainly representing renal cell carcinoma.  No evidence of local or distant metastatic disease.  Widely patent left renal vein and IVC with no evidence of tumor thrombus.     2.  Moderate descending and sigmoid colonic diverticulosis.  No evidence of diverticulitis.     3.  Moderate hepatic steatosis.       Renal US 4/6/2017  IMPRESSION:     1.  Heterogeneous large indeterminate mass in the left posterior kidney, highly suspicious for a renal cell carcinoma.             Past Medical History:   Diagnosis Date    Cancer [facility])       KIDNEY               Past Surgical History:   Procedure Laterality Date    HX TONSILLECTOMY         CHILDHOOD       History reviewed. No pertinent family history.          Social History   Substance Use Topics    Smoking status: Never Smoker    Smokeless tobacco: Never Used    Alcohol use Yes               Allergies   Allergen Reactions    Codeine Other (comments)       nightmares         Review of Systems  Constitutional: Fever: No  Skin: Rash: No  HEENT: Hearing difficulty: No  Eyes: Blurred vision: No  Cardiovascular: Chest pain: No  Respiratory: Shortness of breath: No  Gastrointestinal: Nausea/vomiting: No  Musculoskeletal: Back pain: No  Neurological: Weakness: No  Psychological: Memory loss: No  Comments/additional findings:            Objective:           Visit Vitals    /70    Ht 6' 6\" (1.981 m)    Wt (!) 395 lb (179.2 kg)    BMI 45.65 kg/m2   Constitutional: WDWN, Pleasant and appropriate affect, No acute distress. HEENT: EOMs in tact  Respiratory: No respiratory distress or difficulties  CV: No peripheral swelling noted  Abdomen: No abdominal masses or tenderness. Skin: Normal color and texture and No rashes or erythema noted  Neuro/Psych: Alert and Oriented x3, affect appropriate.    EXT: no cyanosis or edema          Labs  Reviewed in media     Radha Ugalde MD  [facility]  [address] [facility], [address] [facility]  Phone: [phone]  Pager: [phone]

## 2017-05-22 NOTE — OP NOTES
Ricky Browne    Name:  Emmanuel Crump  MR#:  [id number]  :  1960  Account #:  [de-identified]  Date of Adm:  2017  Date of Surgery:  2017      PREOPERATIVE DIAGNOSIS: Left renal mass. POSTOPERATIVE DIAGNOSIS: Left renal mass. PROCEDURES PERFORMED:  1. Cystoscopy. 2. Left robotic-assisted laparoscopic nephrectomy (adrenal sparing). SURGEON: Dory Romero MD    ASSISTANT:  1. Tato Blanco. 2. Ena Stromberg, PGY-5. ANESTHESIA: General.    FLUIDS: 1400 mL crystalloid. ESTIMATED BLOOD LOSS: 25 mL. SPECIMENS REMOVED: Left kidney. DRAINS: A 16-Martiniquais [place] catheter. INTRAOPERATIVE FINDINGS:  1. On flexible cystourethroscopy, the patient had no bladder lesions. The patient had moderate trilobar hypertrophy. 2. On inspection of the abdomen, no enlarged lymph nodes were  noted. 3. The patient was noted to have a single renal artery and a single  renal vein. INDICATIONS FOR THE PROCEDURE: The patient was a 57-year-  old male with an 8 cm left renal mass concerning for cancer. The  patient was also noted to have gross hematuria. DESCRIPTION OF PROCEDURE: The patient was first identified in  the holding area and taken back to the operating room. Perioperative  antibiotics were given and sequential compression devices placed. Anesthesia was induced. The patient was placed in the left side up  flank position, taking care to pad all pressure points. The patient was  prepped and draped in the usual sterile fashion. Time-out was  performed. Initially, flexible cystoscope was inserted into the patient's bladder. Systematic cystoscopy was performed with findings as noted above. Next, the patient was reprepped and draped in the usual sterile  fashion. Next, just lateral to the umbilicus past the level of the rectus  belly, we used the Veress needle to gain access into the abdomen.   Drop test was performed along with aspiration followed by insufflation  at low pressure to 15 mmHg. Our ports were placed in a straight line  just lateral to the rectus belly. We marked out for our robotic port  starting approximately 2 fingerbreadths below the costal margin and  moving downwards with approximately a handsbreadth in between. The assistant 12 mm trocar was placed approximately a handsbreadth  medial to the second and third robotic arms. The first trocar was the  camera trocar and this was placed using feel. The remainder of the  ports were placed under direct vision. William was used to  preplace a 0 Vicryl suture in the 12 port site. We then proceeded to  dock the Sheology excise surgical system. We used a fenestrated  bipolar in the left hand and monopolar scissors in the right hand. We  used a ProGrasp in the fourth robotic arm. First, the white line of Toldt was incised. The left descending colon was  medialized sweeping the mesentery off of the kidney in Gerota's fascia. The gonadal was identified and dissected to identify the renal vein. The  gonadal was freed and medialized. Lift was obtained and the kidney  was put on stretch with the fourth robotic arm. The hilar vessels  identified and encircled completely. Once this was done, 3 Weck clips  were placed on the artery using the robotic clip applier and one was  placed above. Next, the renal vein was clipped with two down Weck  clips and one up. A small lumbar vessel was also clipped with Weck  clips. The gonadal vessel was taken with heat using bipolar cautery. The renal artery was divided and the renal vein was divided. The  kidney was freed off the adrenal gland. All posterior and lateral  attachments were taken down. Once the kidney was completely free,  we introduced a large EndoCatch bag through the assistant port site. The kidney was placed within the bag and brought out through the 12  mm port site. Once this was done, we placed the 12 mm port back in.   The pressure was taken down to 7 mmHg, hemostasis was assured. FloSeal was applied over the hilum and the edge of the adrenal gland. At this point, we made a small Cohen incision at the level of the  robotic fourth arm. The kidney was delivered through this site. We then  closed down our Dexter-Viry suture over the 12 mm ports and all  the port sites were removed. We proceeded to close the extraction  incision first with an 0 Vicryl closing the peritoneum, followed by a #1  PDS in a running fashion. The wound was irrigated. Marcaine was  infiltrated throughout all the incisions. All port sites and the extraction  site were closed with 4-0 Monocryl and surgical glue. The patient was  awakened from anesthesia and taken back to the PACU in stable  condition. Of note, I was scrubbed and present for all critical portions of  the case.         Jeanna Henry MD    DK / MS1  D:  05/22/2017   11:57  T:  05/22/2017   13:03  Job #:  [id number]

## 2017-05-22 NOTE — ANESTHESIA POSTPROCEDURE EVALUATION
Post-Anesthesia Evaluation and Assessment    Patient: Ava Casanova MRN: [id number]  SSN: [id number]    YOB: 1960  Age: 49 y.o. Sex: male      Data from PACU flowsheet    Cardiovascular Function/Vital Signs  Visit Vitals    BP (!) 163/102    Pulse 86    Temp 36.8 °C (98.2 °F)    Resp 12    Ht 6' 6\" (1.981 m)    Wt (!) 186.9 kg (412 lb)    SpO2 95%    BMI 47.61 kg/m2       Patient is status post general anesthesia for Procedure(s):  davinci cystoscopy  left NEPHRECTOMY  ROBOTIC ASSISTED. Nausea/Vomiting: controlled    Postoperative hydration reviewed and adequate. Pain:  Pain Scale 1: Visual (05/22/17 1328)  Pain Intensity 1: 4 (05/22/17 1328)   Managed      Mental Status and Level of Consciousness: Alert and oriented     Pulmonary Status:   O2 Device: Oxygen mask (05/22/17 1221)   Adequate oxygenation and airway patent    Complications related to anesthesia: None    Post-anesthesia assessment completed.  No concerns    Signed By: Jennifer Ramírez MD     May 22, 2017

## 2017-05-22 NOTE — PERIOP NOTES
TRANSFER - OUT REPORT:    Verbal report given to [address], RN(name) on Milad Nye  being transferred to 2[address](unit) for routine post - op       Report consisted of patients Situation, Background, Assessment and   Recommendations(SBAR). Information from the following report(s) SBAR, Kardex, OR Summary, Intake/Output, MAR and Recent Results was reviewed with the receiving nurse. Lines:   Peripheral IV 05/22/17 Left Hand (Active)   Site Assessment Clean, dry, & intact 5/22/2017 12:21 PM   Phlebitis Assessment 0 5/22/2017 12:21 PM   Infiltration Assessment 0 5/22/2017 12:21 PM   Dressing Status Clean, dry, & intact 5/22/2017 12:21 PM   Dressing Type Transparent;Tape 5/22/2017 12:21 PM   Hub Color/Line Status Pink;Capped 5/22/2017 12:21 PM       Peripheral IV 05/22/17 Right Hand (Active)   Site Assessment Clean, dry, & intact 5/22/2017 12:21 PM   Phlebitis Assessment 0 5/22/2017 12:21 PM   Infiltration Assessment 0 5/22/2017 12:21 PM   Dressing Status Clean, dry, & intact 5/22/2017 12:21 PM   Dressing Type Transparent;Tape 5/22/2017 12:21 PM   Hub Color/Line Status Pink; Infusing 5/22/2017 12:21 PM        Opportunity for questions and clarification was provided.       Patient transported with:   O2 @ 4 liters  Tech

## 2017-05-22 NOTE — ANESTHESIA PREPROCEDURE EVALUATION
Anesthetic History   No history of anesthetic complications            Review of Systems / Medical History  Patient summary reviewed and pertinent labs reviewed    Pulmonary  Within defined limits                 Neuro/Psych   Within defined limits           Cardiovascular  Within defined limits                Exercise tolerance: >4 METS     GI/Hepatic/Renal         Renal disease       Endo/Other        Morbid obesity and cancer     Other Findings   Comments:   Risk Factors for Postoperative nausea/vomiting:       History of postoperative nausea/vomiting? NO       Female? NO       Motion sickness? NO       Intended opioid administration for postoperative analgesia? YES      Smoking Abstinence  Current Smoker? NO  Elective Surgery? YES  Seen preoperatively by anesthesiologist or proxy prior to day of surgery? YES  Pt abstained from smoking 24 hours prior to anesthesia?  N/A         Physical Exam    Airway  Mallampati: III  TM Distance: 4 - 6 cm  Neck ROM: normal range of motion   Mouth opening: Normal     Cardiovascular  Regular rate and rhythm,  S1 and S2 normal,  no murmur, click, rub, or gallop  Rhythm: regular  Rate: normal         Dental  No notable dental hx       Pulmonary  Breath sounds clear to auscultation               Abdominal  GI exam deferred       Other Findings            Anesthetic Plan    ASA: 3  Anesthesia type: general          Induction: Intravenous  Anesthetic plan and risks discussed with: Patient

## 2017-05-23 ENCOUNTER — APPOINTMENT (OUTPATIENT)
Dept: GENERAL RADIOLOGY | Age: 57
DRG: 657 | End: 2017-05-23
Attending: FAMILY MEDICINE
Payer: COMMERCIAL

## 2017-05-23 LAB
ANION GAP BLD CALC-SCNC: 14 MMOL/L (ref 3–18)
BNP SERPL-MCNC: 96 PG/ML (ref 0–900)
BUN SERPL-MCNC: 14 MG/DL (ref 7–18)
BUN/CREAT SERPL: 9 (ref 12–20)
CALCIUM SERPL-MCNC: 8.6 MG/DL (ref 8.5–10.1)
CHLORIDE SERPL-SCNC: 104 MMOL/L (ref 100–108)
CO2 SERPL-SCNC: 21 MMOL/L (ref 21–32)
CREAT SERPL-MCNC: 1.57 MG/DL (ref 0.6–1.3)
ERYTHROCYTE [DISTWIDTH] IN BLOOD BY AUTOMATED COUNT: 13.8 % (ref 11.6–14.5)
GLUCOSE SERPL-MCNC: 91 MG/DL (ref 74–99)
HCT VFR BLD AUTO: 46.9 % (ref 36–48)
HGB BLD-MCNC: 15.8 G/DL (ref 13–16)
MCH RBC QN AUTO: 29.4 PG (ref 24–34)
MCHC RBC AUTO-ENTMCNC: 33.7 G/DL (ref 31–37)
MCV RBC AUTO: 87.2 FL (ref 74–97)
PLATELET # BLD AUTO: 240 K/UL (ref 135–420)
PMV BLD AUTO: 10.3 FL (ref 9.2–11.8)
POTASSIUM SERPL-SCNC: 4.9 MMOL/L (ref 3.5–5.5)
RBC # BLD AUTO: 5.38 M/UL (ref 4.7–5.5)
SODIUM SERPL-SCNC: 139 MMOL/L (ref 136–145)
TROPONIN I SERPL-MCNC: <0.02 NG/ML (ref 0–0.04)
WBC # BLD AUTO: 12.2 K/UL (ref 4.6–13.2)

## 2017-05-23 PROCEDURE — 71010 XR CHEST PORT: CPT

## 2017-05-23 PROCEDURE — 93005 ELECTROCARDIOGRAM TRACING: CPT

## 2017-05-23 PROCEDURE — 36415 COLL VENOUS BLD VENIPUNCTURE: CPT | Performed by: UROLOGY

## 2017-05-23 PROCEDURE — 74011250636 HC RX REV CODE- 250/636

## 2017-05-23 PROCEDURE — 85027 COMPLETE CBC AUTOMATED: CPT | Performed by: UROLOGY

## 2017-05-23 PROCEDURE — 65270000029 HC RM PRIVATE

## 2017-05-23 PROCEDURE — 74011250637 HC RX REV CODE- 250/637

## 2017-05-23 PROCEDURE — 84484 ASSAY OF TROPONIN QUANT: CPT | Performed by: FAMILY MEDICINE

## 2017-05-23 PROCEDURE — 74011250636 HC RX REV CODE- 250/636: Performed by: UROLOGY

## 2017-05-23 PROCEDURE — 77030020263 HC SOL INJ SOD CL0.9% LFCR 1000ML

## 2017-05-23 PROCEDURE — 83880 ASSAY OF NATRIURETIC PEPTIDE: CPT | Performed by: FAMILY MEDICINE

## 2017-05-23 PROCEDURE — 74011250637 HC RX REV CODE- 250/637: Performed by: UROLOGY

## 2017-05-23 PROCEDURE — 74011250637 HC RX REV CODE- 250/637: Performed by: FAMILY MEDICINE

## 2017-05-23 PROCEDURE — 80048 BASIC METABOLIC PNL TOTAL CA: CPT | Performed by: UROLOGY

## 2017-05-23 PROCEDURE — 74011000258 HC RX REV CODE- 258: Performed by: UROLOGY

## 2017-05-23 RX ORDER — NITROGLYCERIN 0.4 MG/1
0.4 TABLET SUBLINGUAL AS NEEDED
Status: DISCONTINUED | OUTPATIENT
Start: 2017-05-23 | End: 2017-05-26 | Stop reason: HOSPADM

## 2017-05-23 RX ORDER — NITROGLYCERIN 0.4 MG/1
TABLET SUBLINGUAL
Status: COMPLETED
Start: 2017-05-23 | End: 2017-05-23

## 2017-05-23 RX ORDER — MORPHINE SULFATE 2 MG/ML
INJECTION, SOLUTION INTRAMUSCULAR; INTRAVENOUS
Status: DISPENSED
Start: 2017-05-23 | End: 2017-05-24

## 2017-05-23 RX ORDER — MORPHINE SULFATE 2 MG/ML
2 INJECTION, SOLUTION INTRAMUSCULAR; INTRAVENOUS ONCE
Status: COMPLETED | OUTPATIENT
Start: 2017-05-23 | End: 2017-05-23

## 2017-05-23 RX ADMIN — MORPHINE SULFATE 2 MG: 2 INJECTION, SOLUTION INTRAMUSCULAR; INTRAVENOUS at 23:04

## 2017-05-23 RX ADMIN — SODIUM CHLORIDE 125 ML/HR: 900 INJECTION, SOLUTION INTRAVENOUS at 18:54

## 2017-05-23 RX ADMIN — SODIUM CHLORIDE 125 ML/HR: 900 INJECTION, SOLUTION INTRAVENOUS at 08:57

## 2017-05-23 RX ADMIN — HEPARIN SODIUM 5000 UNITS: 5000 INJECTION, SOLUTION INTRAVENOUS; SUBCUTANEOUS at 15:40

## 2017-05-23 RX ADMIN — HEPARIN SODIUM 5000 UNITS: 5000 INJECTION, SOLUTION INTRAVENOUS; SUBCUTANEOUS at 06:10

## 2017-05-23 RX ADMIN — OXYCODONE HYDROCHLORIDE AND ACETAMINOPHEN 2 TABLET: 5; 325 TABLET ORAL at 08:55

## 2017-05-23 RX ADMIN — OXYCODONE HYDROCHLORIDE AND ACETAMINOPHEN 1 TABLET: 5; 325 TABLET ORAL at 14:45

## 2017-05-23 RX ADMIN — NITROGLYCERIN 0.4 MG: 0.4 TABLET SUBLINGUAL at 22:43

## 2017-05-23 RX ADMIN — PHENOBARBITAL 35 ML: 16.2; .1037; .0065; .0194 ELIXIR ORAL at 23:05

## 2017-05-23 RX ADMIN — HEPARIN SODIUM 5000 UNITS: 5000 INJECTION, SOLUTION INTRAVENOUS; SUBCUTANEOUS at 22:07

## 2017-05-23 RX ADMIN — DOCUSATE SODIUM 100 MG: 100 CAPSULE, LIQUID FILLED ORAL at 21:56

## 2017-05-23 RX ADMIN — CEFAZOLIN SODIUM 1 G: 1 INJECTION, POWDER, FOR SOLUTION INTRAMUSCULAR; INTRAVENOUS at 06:08

## 2017-05-23 RX ADMIN — SODIUM CHLORIDE 125 ML/HR: 900 INJECTION, SOLUTION INTRAVENOUS at 00:11

## 2017-05-23 RX ADMIN — HEPARIN SODIUM 5000 UNITS: 5000 INJECTION, SOLUTION INTRAVENOUS; SUBCUTANEOUS at 00:11

## 2017-05-23 RX ADMIN — OXYCODONE HYDROCHLORIDE AND ACETAMINOPHEN 1 TABLET: 5; 325 TABLET ORAL at 21:56

## 2017-05-23 RX ADMIN — DOCUSATE SODIUM 100 MG: 100 CAPSULE, LIQUID FILLED ORAL at 08:55

## 2017-05-23 RX ADMIN — OXYCODONE HYDROCHLORIDE AND ACETAMINOPHEN 1 TABLET: 5; 325 TABLET ORAL at 15:38

## 2017-05-23 RX ADMIN — DOCUSATE SODIUM 100 MG: 100 CAPSULE, LIQUID FILLED ORAL at 15:40

## 2017-05-23 NOTE — PROGRESS NOTES
Patient has designated his wife to participate in his/her discharge plan and to receive any needed information.      Name:   Kathi Salcido  spouse   [phone]   [address] [place], [place] [id number]   May 23, 2017     Address:  Phone number:

## 2017-05-23 NOTE — PROGRESS NOTES
[facility]   Discharge Planning/ Assessment    Reasons for Intervention: Interviewed patient, he agrees to share his discharge information with his wife, see below. He was independent prior to admission and see Dr Nik Merritt for his primary care needs. His discharge plan is to return home.      High Risk Criteria  [x] Yes  []No   Physician Referral  [] Yes  [x]No        Date    Nursing Referral  [] Yes  [x]No        Date    Patient/Family Request  [] Yes  [x]No        Date       Resources:    Medicare  [] Yes  [x]No   Medicaid  [] Yes  [x]No   No Resources  [x] Yes  []No   Private Insurance  [] Yes  [x]No    Name/Phone Number    Other  [] Yes  [x]No        (i.e. Workman's Comp)         Prior Services:    Prior Services  [] Yes  [x]No   Home Health  [] Yes  [x]No   [address] [facility]  [] Yes  [x]No        [address] [facility] [address] Program  [] Yes  [x]No       Meals on Wheels  [] Yes  [x]No   Office on Aging  [] Yes  [x]No   Transportation Services  [] Yes  [x]No   Nursing Home  [] Yes  [x]No        Nursing Home Name    [address]  [] Yes  [x]No        [address] Name    Other       Information Source:      Information obtained from  [x] Patient  [] Parent   [] [address]   [] Child  [] Spouse   [] Significant Other/Partner   [] Friend      [] EMS    [] Nursing Home Chart          [x] Other: chart   Chart Review  [x] Yes  []No     Family/Support System:    Patient lives with  [] Alone    [x] Spouse   [] Significant Other  [] Children  [] Caretaker   [] Parent  [] Sibling     [] Other       Other Support System:    Is the patient responsible for care of others  [] Yes  [x]No   Information of person caring for patient on  discharge self   Managers financial affairs independently  [x] Yes  []No   If no, explain:      Status Prior to Admission:    Mental Status  [x] Awake  [x] Alert  [x] Oriented  [] Quiet/Calm [] Lethargic/Sedated   [] Disoriented  [] Restless/Anxious  [] Combative   Personal Care  [] Dependent  [x] Independent Personal Care  [] Requires Assistance   Meal Preparation Ability  [x] Independent   [] Standby Assistance   [] Minimal Assistance   [] Moderate Assistance  [] Maximum Assistance     [] Total Assistance   Chores  [x] Independent with Chores   [] N/A Nursing Home Resident   [] Requires Assistance   Bowel/Bladder  [x] Continent  [] Catheter  [] Incontinent  [] Ostomy Self-Care    [] Urine Diversion Self-Care  [] Maximum Assistance     [] Total Assistance   Number of Persons needed for assistance    DME at home  [] Eugena Pool Lennox Barnett  [] Eugena Pool, Straight   [] Commode    [] Bathroom/Grab Bars  [] Hospital Bed  [] Nebulizer  [] Oxygen           [] Raised Toilet Seat  [] Shower Chair  [] Side Rails for Bed   [] Tub Transfer Bench   [] Mau Jane  [] Fletcher Carroll      [] Other:   Vendor      Treatment Presently Receiving:    Current Treatments  [] Chemotherapy  [] Dialysis  [] Insulin  [] IVAB [x] IVF   [] O2  [] PCA   [] PT   [] RT   [] Tube Feedings   [] Wound Care     Psychosocial Evaluation:    Verbalized Knowledge of Disease Process  [x] Patient  [x]Family   Coping with Disease Process  [x] Patient  [x]Family   Requires Further Counseling Coping with Disease Process  [] Patient  []Family     Identified Projected Needs:    Home Health Aid  [] Yes  [x]No   Transportation  [] Yes  [x]No   Education  [] Yes  [x]No        Specific Education     Financial Counseling  [] Yes  [x]No   Inability to Care for Self/Will Require 24 hour care  [] Yes  [x]No   Pain Management  [] Yes  [x]No   Home Infusion Therapy  [] Yes  [x]No   Oxygen Therapy  [] Yes  [x]No   DME  [] Yes  [x]No   Long Term Care Placement  [] Yes  [x]No   Rehab  [] Yes  [x]No   Physical Therapy  [] Yes  [x]No   Needs Anticipated At This Time  [] Yes  [x]No     Intra-Hospital Referral:    [facility]  [] Yes  [x]No     [] Yes  [x]No   Patient Representative  [] Yes  [x]No Staff for Teaching Needs  [] Yes  [x]No   Specialty Teaching Needs     Diabetic Educator  [] Yes  [x]No   Referral for Diabetic Educator Needed  [] Yes  [x]No  If Yes, place order for Nutritionist or Diabetic Consult     Tentative Discharge Plan:    Home with No Services  [x] Yes  []No   Home with [address]  [] Yes  [x]No        If Yes, specify type    [address]  [] Yes  [x]No        If Yes, specify type    Meals on Wheels  [] Yes  [x]No   Office of Aging  [] Yes  [x]No   NHP  [] Yes  [x]No   Return to the Nursing Home  [] Yes  [x]No   Rehab Therapy  [] Yes  [x]No   Acute Rehab  [] Yes  [x]No   Subacute Rehab  [] Yes  [x]No   Private Care  [] Yes  [x]No   Substance Abuse Referral  [] Yes  [x]No   Transportation  [] Yes  [x]No   Chore Service  [] Yes  [x]No   Inpatient Hospice  [] Yes  [x]No   OP RT  [] Yes  [x] No   OP Hemo  [] Yes  [x] No   OP PT  [] Yes  [x]No   Support Group  [] Yes  [x]No   Reach to Recovery  [] Yes  [x]No   OP Oncology Clinic  [] Yes  [x]No   Clinic Appointment  [] Yes  [x]No   DME  [] Yes  []No   Comments    Name of D/C Planner or  Given to Patient or Family Kvng Ferreira RN   Phone Number [phone]   Date May 23, 2017   Time 817 am   If you are discharged home, whom do you designate to participate in your discharge plan and receive any information needed?      Enter name of Ai Burnham  spouse        Phone # of jah         Address of [address], [address] [place] [address]        Updated May 23, 2017        Patient refused to designate any           individual

## 2017-05-23 NOTE — PROGRESS NOTES
Progress Note    Patient: Lizandro Casper MRN: [id number]  SSN: [id number]    YOB: 1960  Age: 49 y.o.   Sex: male      Admit Date: 5/22/2017    LOS: 1 day     Subjective:     C/o some right hip pain improving, c/o abd pain    Objective:     Vitals:    05/22/17 2026 05/22/17 2339 05/23/17 0400 05/23/17 0730   BP: 126/80 136/90 127/77 114/74   Pulse: 92 91 93 85   Resp: 19 19 19 16   Temp: 97.8 °F (36.6 °C) 98.4 °F (36.9 °C) 98 °F (36.7 °C) 97.5 °F (36.4 °C)   SpO2: 92% 100% 98% 92%   Weight:       Height:            Intake and Output:  Current Shift:    Last three shifts: 05/21 1901 - 05/23 0700  In: 3693.3 [P.O.:960; I.V.:2733.3]  Out: 1505 [Urine:1505]    Physical Exam:   GENERAL: alert, cooperative, no distress, appears stated age  LUNG: unlabored  HEART: regular rate and rhythm  ABDOMEN: Soft, Non tender  EXTREMITIES:  extremities normal, atraumatic, no cyanosis or edema  SKIN: Normal.  PSYCHIATRIC: non focal  INCISION: clean, dry, intact    Lab/Data Review:    Lab Results   Component Value Date/Time    WBC 12.2 05/23/2017 04:00 AM    HGB 15.8 05/23/2017 04:00 AM    HCT 46.9 05/23/2017 04:00 AM    PLATELET 203 56/94/9339 04:00 AM    MCV 87.2 05/23/2017 04:00 AM       Lab Results   Component Value Date/Time    Sodium 139 05/23/2017 04:00 AM    Potassium 4.9 05/23/2017 04:00 AM    Chloride 104 05/23/2017 04:00 AM    CO2 21 05/23/2017 04:00 AM    Anion gap 14 05/23/2017 04:00 AM    Glucose 91 05/23/2017 04:00 AM    BUN 14 05/23/2017 04:00 AM    Creatinine 1.57 05/23/2017 04:00 AM    BUN/Creatinine ratio 9 05/23/2017 04:00 AM    GFR est AA 56 05/23/2017 04:00 AM    GFR est non-AA 46 05/23/2017 04:00 AM    Calcium 8.6 05/23/2017 04:00 AM         POD 1 s/p L RAL Nx    Assessment:     Active Problems:    Renal mass (4/12/2017)        Plan:     [address] as tolerated  IS, wean O2  Pain control  VT in am  Possible home tomorrow    Signed By: Juan Lo MD     May 23, 2017

## 2017-05-23 NOTE — PROGRESS NOTES
Certified Rachael Quan provider rounded on Ramona Tejada to provide education related to sleep apnea after chart review for risk factors. Risk factors include:  1. Cancer  2. BMI exceeds 35.0: 47.6  3. Mallampati III  4. STOP BANG score 5  5. Jameson Sleepiness score 1    Provided patient with the following pamphlets:  1. What is Sleep Apnea  2. Sleep and Medical problems  3. Sleep & Your Heart  4. Common Sleep Problems for Older Adults  5. Tips For Sleep As You Age  10. Tips For Better Sleep In Older Adults    Patient Education:  1. Reviewed sleep hygiene & effects of poor sleep quality. 2. Reviewed relationship between sleep & heart health. 3. Reviewed relationship between sleep & age. 4. Reviewed relationship between sleep & weight. Recommendations:  1. Referral to sleep specialist for evaluation if symptoms of poor sleep quality present. 2. Order baseline PSG testing to be arranged as an outpatient. 3. Follow up with sleep specialist for treatment and management.

## 2017-05-23 NOTE — ROUTINE PROCESS
Bedside and Verbal shift change report given to Liliana Wolfe RN (oncoming nurse) by Alverto Chance RN (offgoing nurse). Report included the following information SBAR, Kardex and MAR. Patient had no acute events overnight. Currently resting in NAD. No express needs reported at this time.

## 2017-05-23 NOTE — PROGRESS NOTES
0596 - received pt in room from Brennon Mackey RN. AO. Pt resting in bed. Pain rated 3-4/10. Call bell at bedside. No distress noted. 2795 - assessment completed. Pt is AOx4. VSS. IV infusing. [place] cath in place and draining. Pt resting in bed, encouraged to sit up for lunch and later ambulate in hallway. 0930 - pt asked if he was up to having a regular diet at this time, pt stated \"I really don't have an appetite at this time. \" pt was able to tolerate fluids. 1029 - observed pt resting. Will round around 1120 to get pt OOB    1125 - observed pt sleeping, wife at bedside. 1350 - pt advised that he has been up walking in his room, paced around room appx 25 steps. No distress noted. Pt resting in bed. States pain is tolerable at this time. Encouraged pt to ask for pain medication when needed. 1800 - assisted pt with ambulation in hallway, pt was able to get to nurses station and then c/o SOB. Pt was escorted back to room, sat up in chair. O2 placed at 2L. No distress noted. 1930 - upon report, pt stated \"I'm not really that hungry. \" Encouraged pt to try a regular diet in the AM

## 2017-05-24 LAB
ANION GAP BLD CALC-SCNC: 9 MMOL/L (ref 3–18)
ATRIAL RATE: 97 BPM
BUN SERPL-MCNC: 13 MG/DL (ref 7–18)
BUN/CREAT SERPL: 9 (ref 12–20)
CALCIUM SERPL-MCNC: 8.4 MG/DL (ref 8.5–10.1)
CALCULATED P AXIS, ECG09: 18 DEGREES
CALCULATED R AXIS, ECG10: 36 DEGREES
CALCULATED T AXIS, ECG11: 33 DEGREES
CHLORIDE SERPL-SCNC: 105 MMOL/L (ref 100–108)
CO2 SERPL-SCNC: 24 MMOL/L (ref 21–32)
CREAT SERPL-MCNC: 1.47 MG/DL (ref 0.6–1.3)
D DIMER PPP FEU-MCNC: 0.81 UG/ML(FEU)
DIAGNOSIS, 93000: NORMAL
ERYTHROCYTE [DISTWIDTH] IN BLOOD BY AUTOMATED COUNT: 14 % (ref 11.6–14.5)
GLUCOSE SERPL-MCNC: 87 MG/DL (ref 74–99)
HCT VFR BLD AUTO: 45.6 % (ref 36–48)
HGB BLD-MCNC: 15 G/DL (ref 13–16)
MCH RBC QN AUTO: 29 PG (ref 24–34)
MCHC RBC AUTO-ENTMCNC: 32.9 G/DL (ref 31–37)
MCV RBC AUTO: 88 FL (ref 74–97)
P-R INTERVAL, ECG05: 176 MS
PLATELET # BLD AUTO: 203 K/UL (ref 135–420)
PMV BLD AUTO: 10.4 FL (ref 9.2–11.8)
POTASSIUM SERPL-SCNC: 4.6 MMOL/L (ref 3.5–5.5)
Q-T INTERVAL, ECG07: 358 MS
QRS DURATION, ECG06: 102 MS
QTC CALCULATION (BEZET), ECG08: 454 MS
RBC # BLD AUTO: 5.18 M/UL (ref 4.7–5.5)
SODIUM SERPL-SCNC: 138 MMOL/L (ref 136–145)
TROPONIN I SERPL-MCNC: <0.02 NG/ML (ref 0–0.04)
VENTRICULAR RATE, ECG03: 97 BPM
WBC # BLD AUTO: 9.9 K/UL (ref 4.6–13.2)

## 2017-05-24 PROCEDURE — 84484 ASSAY OF TROPONIN QUANT: CPT | Performed by: UROLOGY

## 2017-05-24 PROCEDURE — 65270000029 HC RM PRIVATE

## 2017-05-24 PROCEDURE — 93970 EXTREMITY STUDY: CPT

## 2017-05-24 PROCEDURE — 74011250636 HC RX REV CODE- 250/636: Performed by: UROLOGY

## 2017-05-24 PROCEDURE — 85027 COMPLETE CBC AUTOMATED: CPT | Performed by: UROLOGY

## 2017-05-24 PROCEDURE — 77030020263 HC SOL INJ SOD CL0.9% LFCR 1000ML

## 2017-05-24 PROCEDURE — 85379 FIBRIN DEGRADATION QUANT: CPT | Performed by: FAMILY MEDICINE

## 2017-05-24 PROCEDURE — 36415 COLL VENOUS BLD VENIPUNCTURE: CPT | Performed by: FAMILY MEDICINE

## 2017-05-24 PROCEDURE — 80048 BASIC METABOLIC PNL TOTAL CA: CPT | Performed by: UROLOGY

## 2017-05-24 PROCEDURE — 74011250637 HC RX REV CODE- 250/637: Performed by: UROLOGY

## 2017-05-24 RX ADMIN — OXYCODONE HYDROCHLORIDE AND ACETAMINOPHEN 2 TABLET: 5; 325 TABLET ORAL at 10:39

## 2017-05-24 RX ADMIN — HEPARIN SODIUM 5000 UNITS: 5000 INJECTION, SOLUTION INTRAVENOUS; SUBCUTANEOUS at 07:50

## 2017-05-24 RX ADMIN — OXYCODONE HYDROCHLORIDE AND ACETAMINOPHEN 2 TABLET: 5; 325 TABLET ORAL at 15:17

## 2017-05-24 RX ADMIN — DOCUSATE SODIUM 100 MG: 100 CAPSULE, LIQUID FILLED ORAL at 21:28

## 2017-05-24 RX ADMIN — ONDANSETRON 4 MG: 2 INJECTION INTRAMUSCULAR; INTRAVENOUS at 10:40

## 2017-05-24 RX ADMIN — HEPARIN SODIUM 5000 UNITS: 5000 INJECTION, SOLUTION INTRAVENOUS; SUBCUTANEOUS at 21:30

## 2017-05-24 RX ADMIN — SODIUM CHLORIDE 125 ML/HR: 900 INJECTION, SOLUTION INTRAVENOUS at 19:55

## 2017-05-24 RX ADMIN — Medication 10 ML: at 22:00

## 2017-05-24 RX ADMIN — DOCUSATE SODIUM 100 MG: 100 CAPSULE, LIQUID FILLED ORAL at 15:17

## 2017-05-24 RX ADMIN — HEPARIN SODIUM 5000 UNITS: 5000 INJECTION, SOLUTION INTRAVENOUS; SUBCUTANEOUS at 15:18

## 2017-05-24 RX ADMIN — OXYCODONE HYDROCHLORIDE AND ACETAMINOPHEN 2 TABLET: 5; 325 TABLET ORAL at 21:28

## 2017-05-24 RX ADMIN — DOCUSATE SODIUM 100 MG: 100 CAPSULE, LIQUID FILLED ORAL at 09:49

## 2017-05-24 NOTE — ROUTINE PROCESS
Bedside and Verbal shift change report given to Anibal Jennings RN (oncoming nurse) by Deyanira Garcia RN(offgoing nurse). Report included the following information SBAR, Kardex and MAR.

## 2017-05-24 NOTE — ROUTINE PROCESS
The patient is alert and oriented x 4. There is no apparent signs of distress. The patient is on 2L of oxygen. Active bowel sounds. The patient complained of slight nausea, but he refused any nausea medications. Abdomen a little distended, active bowel sounds. Patient lap and incision sites are clean, dry and intact. Vs are stable. Patient aguirre draining clear, yellow urine. Patient encouraged to use ICS. Patient on continuous pulse ox. 2233-Patient complained of chest pain and chest tightness. RRT was called. Patient vs were done, he was given 1 nitro, 2 mg of morphine and one GI cocktail. EKG, d dimer, pro-bnp, troponin, and  Chest x-ray was completed. Patient was given morphine after he stated he still had some chest tightness. PAtient is stable in the room. Patient on 4L of humidified oxygen. PAtient using ICS. 2345-The patient has no complaints of chest pain or chest tightness. Patient oxygen 94%. 2357-Spoke with Dr. Flores Lyon regarding troponin and pro-bnp. Stated to follow-up with regarding d-dimer and chest x-ray. 0031-Spoke to lab regarding d-dimer results. Lab stated that they just got the labs. No answer from x-ray. 0058-Called x-ray no answer, regarding chest x-ray. O2 94% patient     0120-Called lab about d-dimer results, no answer. 0218-Spoke to Dr. Flores Lyon regarding d-dimer. CTA ordered to rule-out PE. Spoke with Dr. Flores Lyon regarding the patient having only one kidney and his kidney function. He stated to have lab draw morning bmp and notify him of results to determine if patient can have CTA. 0550-Called lab regarding bmp. No results available yet. 0559-Paged Dr. Flores Lyon with results. 0616-Spoke to Dr. Flores Lyon regarding lab results, he stated patient was okay to have cta with kidney function, but to ask CT.  CT tech gris stated the parameters were fine for CTA.    0631-Spoke with patient he has some concerns, he wants to speak with Dr. Demi Stacy regarding the test.    Dr. Skinny Prabhakar aware of patient's questions regarding test. CTA chest discontinued, Dr. Skinny Prabhakar aware. VQ scan was ordered. Patient does not meet weight limit for VQ scan. Dr. Skniny Prabhakar aware patient does not meet limit for VQ scan, duplex ordered.

## 2017-05-24 NOTE — PROCEDURES
[facility]  *** FINAL REPORT ***    Name: Keaton Giraldo  MRN: [id number]    Inpatient  : 1960  HIS Order #: [id number]  [id number] [facility] [place] Visit #: [id number]  Date: 24 May 2017    TYPE OF TEST: Peripheral Venous Testing    REASON FOR TEST  Limb swelling    Right Leg:-  Deep venous thrombosis:           No  Superficial venous thrombosis:    No  Deep venous insufficiency:        No  Superficial venous insufficiency: Not examined    Left Leg:-  Deep venous thrombosis:           No  Superficial venous thrombosis:    No  Deep venous insufficiency:        No  Superficial venous insufficiency: Not examined      INTERPRETATION/FINDINGS  Duplex images were obtained using 2-D gray scale, color flow, and  spectral Doppler analysis. Right leg :  1. Deep vein(s) visualized include the common femoral, deep femoral,  proximal femoral, mid femoral, distal femoral, popliteal(above knee),  popliteal(fossa), popliteal(below knee), posterior tibial and peroneal   veins. 2. No evidence of deep venous thrombosis detected in the veins  visualized. 3. Superficial vein(s) visualized include the great saphenous and  small saphenous veins. 4. No evidence of superficial thrombosis detected. 5. No evidence of reflux detected in the deep veins visualized. Left leg :  1. Deep vein(s) visualized include the common femoral, deep femoral,  proximal femoral, mid femoral, distal femoral, popliteal(above knee),  popliteal(fossa), popliteal(below knee), posterior tibial and peroneal   veins. 2. No evidence of deep venous thrombosis detected in the veins  visualized. 3. Superficial vein(s) visualized include the great saphenous and  small saphenous veins. 4. No evidence of superficial thrombosis detected. 5. No evidence of reflux detected in the deep veins visualized. ADDITIONAL COMMENTS  Wet reading given to Nurse Zenia Shelley at 8:40 a.m.     I have personally reviewed the data relevant to the interpretation of  this  study. TECHNOLOGIST: Jenkins Aase. Jessica Hillman  Signed: 05/24/2017 08:51 AM    PHYSICIAN: Bon Amos.  Denisse Chen MD  Signed: 05/24/2017 11:46 PM

## 2017-05-24 NOTE — ROUTINE PROCESS
Bedside and Verbal shift change report given to Yared Handley RN (oncoming nurse) by MARY Juarez (offgoing nurse). Report included the following information SBAR, Kardex and MAR.

## 2017-05-24 NOTE — PROGRESS NOTES
RRT note: RRT called for CP with pressure, no diaphoresis, no radiation of pain, no SOB. ECG completed and showed NSR. SL NTG x 1 given, Cardiac Enzymes drawn and sent to lab, GI cocktail and morphine IV for pain ordered by Dr. Marla Walton. Patient will remain in room at this time. Instructed patient and wife on the importance of ISP usage and the significance of its use. 0500-No increase in cardiac enzymes. Resting comfortably at this time.

## 2017-05-24 NOTE — H&P
History and Physical    Patient: Tesfaye Guy               Sex: male          DOA: 2017       YOB: 1960      Age:  49 y.o.        LOS:  LOS: 1 day        No chief complaint on file. HPI:     Tesfaye Guy is a 49 y.o. male who is s/p left nephrectomy. Consulted to manage chest pain. Patient had chest pain earlier this evening . The pain is now resolved but was mid sternal with no radiation. He also has been having sob since morning. He denies any fever, cough. He was placed on 4L NC and was sating upper 80's. Patient was given Nitro SL X 1 and pain resolved. CXR , DDIMER , Troponin, ProBNP were ordered. EKG bedside was NSR. Labs reviewed. DDIMER was 0.81. Patient for CTA chest to r/o PE. Past Medical History:   Diagnosis Date    Cancer [facility])     KIDNEY    Ill-defined condition     slightly enlarged heart     Ill-defined condition     2 herniated discs back    . Past Surgical History:   Procedure Laterality Date    HX TONSILLECTOMY      CHILDHOOD       No current facility-administered medications on file prior to encounter. No current outpatient prescriptions on file prior to encounter. Social History     Social History    Marital status:      Spouse name: N/A    Number of children: N/A    Years of education: N/A     Occupational History    Not on file. Social History Main Topics    Smoking status: Never Smoker    Smokeless tobacco: Never Used    Alcohol use Yes      Comment: occasionally     Drug use: No    Sexual activity: Not on file     Other Topics Concern    Not on file     Social History Narrative       Prior to Admission Medications   Prescriptions Last Dose Informant Patient Reported? Taking?   acetaminophen (TYLENOL) 325 mg tablet   Yes Yes   Si mg every four (4) hours as needed for Pain. Facility-Administered Medications: None       History reviewed. No pertinent family history.     Review of Systems   Constitutional: Negative. HENT: Negative. Eyes: Negative. Respiratory: Positive for shortness of breath. Negative for cough, sputum production and wheezing. Cardiovascular: Positive for chest pain. Gastrointestinal: Negative. Genitourinary: Negative. Musculoskeletal: Positive for back pain. Skin: Negative. Neurological: Negative. Endo/Heme/Allergies: Negative. Psychiatric/Behavioral: Negative. Physical Exam:       Visit Vitals    /75    Pulse 98    Temp 99 °F (37.2 °C)    Resp 16    Ht 6' 6\" (1.981 m)    Wt (!) 186.9 kg (412 lb)    SpO2 91%    BMI 47.61 kg/m2       Physical Exam   Constitutional: He appears well-developed and well-nourished. obese   HENT:   Head: Normocephalic and atraumatic. Eyes: Conjunctivae and EOM are normal. Pupils are equal, round, and reactive to light. No scleral icterus. Neck: Neck supple. Cardiovascular: Normal rate, regular rhythm and normal heart sounds. No murmur heard. Pulmonary/Chest: No respiratory distress. He has no wheezes. He has no rales. Abdominal: Soft. Bowel sounds are normal. He exhibits no distension. There is no tenderness. Musculoskeletal: He exhibits no edema. Neurological: He is alert. Skin: Skin is warm. No rash noted. No erythema. No pallor. Psychiatric: He has a normal mood and affect. Vitals reviewed. Ancillary Studies: All lab and imaging reviewed for the past 24 hours.     Recent Results (from the past 24 hour(s))   METABOLIC PANEL, BASIC    Collection Time: 05/23/17  4:00 AM   Result Value Ref Range    Sodium 139 136 - 145 mmol/L    Potassium 4.9 3.5 - 5.5 mmol/L    Chloride 104 100 - 108 mmol/L    CO2 21 21 - 32 mmol/L    Anion gap 14 3.0 - 18 mmol/L    Glucose 91 74 - 99 mg/dL    BUN 14 7.0 - 18 MG/DL    Creatinine 1.57 (H) 0.6 - 1.3 MG/DL    BUN/Creatinine ratio 9 (L) 12 - 20      GFR est AA 56 (L) >60 ml/min/1.73m2    GFR est non-AA 46 (L) >60 ml/min/1.73m2    Calcium 8.6 8.5 - 10.1 MG/DL   CBC W/O DIFF    Collection Time: 05/23/17  4:00 AM   Result Value Ref Range    WBC 12.2 4.6 - 13.2 K/uL    RBC 5.38 4.70 - 5.50 M/uL    HGB 15.8 13.0 - 16.0 g/dL    HCT 46.9 36.0 - 48.0 %    MCV 87.2 74.0 - 97.0 FL    MCH 29.4 24.0 - 34.0 PG    MCHC 33.7 31.0 - 37.0 g/dL    RDW 13.8 11.6 - 14.5 %    PLATELET 334 033 - 230 K/uL    MPV 10.3 9.2 - 11.8 FL   EKG, 12 LEAD, INITIAL    Collection Time: 05/23/17 10:44 PM   Result Value Ref Range    Ventricular Rate 97 BPM    Atrial Rate 97 BPM    P-R Interval 176 ms    QRS Duration 102 ms    Q-T Interval 358 ms    QTC Calculation (Bezet) 454 ms    Calculated P Axis 18 degrees    Calculated R Axis 36 degrees    Calculated T Axis 33 degrees    Diagnosis       Normal sinus rhythm  Normal ECG  No previous ECGs available         Assessment/Plan     Active Problems:    Renal mass (4/12/2017)      Chest Pain   Dyspnea    PLAN:    Renal mass   - s/p left Nephrectomy   - Urology following     SOB  - Elevated DDIMER  - CXR reviewed . No acute cardiopulmonary finding   - Suppl O2   - CTA chest pending r/o PE    Chest pain   - resolved   - EKG non diagnostic   - Troponin wnl.  Trend     DVT Prophylaxis   Follow AM labs         Sari Ott MD  5/23/2017  11:43 PM

## 2017-05-24 NOTE — ROUTINE PROCESS
Bedside and Verbal shift change report given to Lucy Sharma RN (oncoming nurse) by Tg Seals RN(offgoing nurse). Report included the following information SBAR, Kardex and MAR.

## 2017-05-24 NOTE — PROGRESS NOTES
[facility]  Hospitalist Division    Daily progress Note    Patient: Keke King MRN: [id number]  CSN: [id number]    YOB: 1960  Age: 49 y.o.   Sex: male    DOA: 5/22/2017 LOS:  LOS: 2 days                    Subjective:     CC: chest pain     Pt denies any CP or dyspnea today    Objective:      Visit Vitals    /87 (BP 1 Location: Right arm, BP Patient Position: At rest)    Pulse 84    Temp 98.5 °F (36.9 °C)    Resp 18    Ht 6' 6\" (1.981 m)    Wt (!) 186.9 kg (412 lb)    SpO2 90%    BMI 47.61 kg/m2       Physical Exam:    NC/AT  NO JVD,TMG  S1/S2 RRR  CTAB, NO WHEEZING  NT,ND, NABS  NO EDEMA  CN INTACT, MS EQUAL ALL 4 EXT        Intake and Output:  Current Shift:  05/24 0701 - 05/24 1900  In: -   Out: 1950 [Urine:1950]  Last three shifts:  05/22 1901 - 05/24 0700  In: 1860 [P.O.:1860]  Out: 4050 [Urine:4050]    Recent Results (from the past 24 hour(s))   EKG, 12 LEAD, INITIAL    Collection Time: 05/23/17 10:44 PM   Result Value Ref Range    Ventricular Rate 97 BPM    Atrial Rate 97 BPM    P-R Interval 176 ms    QRS Duration 102 ms    Q-T Interval 358 ms    QTC Calculation (Bezet) 454 ms    Calculated P Axis 18 degrees    Calculated R Axis 36 degrees    Calculated T Axis 33 degrees    Diagnosis       Normal sinus rhythm  Normal ECG  No previous ECGs available  Confirmed by Andres Chavarria ([id number]) on 5/24/2017 10:59:39 AM     TROPONIN I    Collection Time: 05/23/17 11:05 PM   Result Value Ref Range    Troponin-I, Qt. <0.02 0.0 - 0.045 NG/ML   PRO-BNP    Collection Time: 05/23/17 11:05 PM   Result Value Ref Range    NT pro-BNP 96 0 - 900 PG/ML   D DIMER    Collection Time: 05/24/17 12:15 AM   Result Value Ref Range    D DIMER 0.81 (H) <0.46 ug/ml(FEU)   METABOLIC PANEL, BASIC    Collection Time: 05/24/17  4:40 AM   Result Value Ref Range    Sodium 138 136 - 145 mmol/L    Potassium 4.6 3.5 - 5.5 mmol/L    Chloride 105 100 - 108 mmol/L    CO2 24 21 - 32 mmol/L Anion gap 9 3.0 - 18 mmol/L    Glucose 87 74 - 99 mg/dL    BUN 13 7.0 - 18 MG/DL    Creatinine 1.47 (H) 0.6 - 1.3 MG/DL    BUN/Creatinine ratio 9 (L) 12 - 20      GFR est AA >60 >60 ml/min/1.73m2    GFR est non-AA 50 (L) >60 ml/min/1.73m2    Calcium 8.4 (L) 8.5 - 10.1 MG/DL   CBC W/O DIFF    Collection Time: 05/24/17  4:40 AM   Result Value Ref Range    WBC 9.9 4.6 - 13.2 K/uL    RBC 5.18 4.70 - 5.50 M/uL    HGB 15.0 13.0 - 16.0 g/dL    HCT 45.6 36.0 - 48.0 %    MCV 88.0 74.0 - 97.0 FL    MCH 29.0 24.0 - 34.0 PG    MCHC 32.9 31.0 - 37.0 g/dL    RDW 14.0 11.6 - 14.5 %    PLATELET 625 136 - 455 K/uL    MPV 10.4 9.2 - 11.8 FL   TROPONIN I    Collection Time: 05/24/17  4:40 AM   Result Value Ref Range    Troponin-I, Qt. <0.02 0.0 - 0.045 NG/ML         Current Facility-Administered Medications:     nitroglycerin (NITROSTAT) tablet 0.4 mg, 0.4 mg, SubLINGual, PRN, Rhoda Booker MD, 0.4 mg at 05/23/17 2243    sodium chloride (NS) flush 5-10 mL, 5-10 mL, IntraVENous, Q8H, Tato Blanco MD, Stopped at 05/23/17 1400    sodium chloride (NS) flush 5-10 mL, 5-10 mL, IntraVENous, PRN, Syeda Farias MD    acetaminophen (TYLENOL) tablet 650 mg, 650 mg, Oral, Q4H PRN, Syeda Farias MD    oxyCODONE-acetaminophen (PERCOCET) 5-325 mg per tablet 1-2 Tab, 1-2 Tab, Oral, Q4H PRN, Syeda Farias MD, 2 Tab at 05/24/17 1517    morphine injection 4 mg, 4 mg, IntraVENous, Q4H PRN, Syeda Farias MD, 4 mg at 05/22/17 1515    naloxone (NARCAN) injection 0.4 mg, 0.4 mg, IntraVENous, PRN, Tato Blanco MD    ondansetron (ZOFRAN) injection 4 mg, 4 mg, IntraVENous, Q4H PRN, Tato Blanco MD, 4 mg at 05/24/17 1040    diphenhydrAMINE (BENADRYL) injection 12.5 mg, 12.5 mg, IntraVENous, Q4H PRN, Tato Blanco MD    docusate sodium (COLACE) capsule 100 mg, 100 mg, Oral, TID, Tato Blanco MD, 100 mg at 05/24/17 1517    heparin (porcine) injection 5,000 Units, 5,000 Units, SubCUTAneous, Q8H, Tato Blanco MD, 5,000 Units at 05/24/17 1518    zolpidem (AMBIEN) tablet 5 mg, 5 mg, Oral, QHS PRN, Alpa Jean-Baptiste MD    0.9% sodium chloride infusion, 125 mL/hr, IntraVENous, CONTINUOUS, Tato Blanco MD, Last Rate: 125 mL/hr at 05/23/17 1854, 125 mL/hr at 05/23/17 1854    Lab Results   Component Value Date/Time    Glucose 87 05/24/2017 04:40 AM    Glucose 91 05/23/2017 04:00 AM        Assessment/Plan     Active Problems:    Renal mass (4/12/2017)      S/p left nephrectomy   Atypical chest pain, most likely MS in origin   Low risk for PE, pt is very active and works two jobs  LE PVL is negative  High risk for nephropathy with CKD and solitary kidney  Consider VQ scan if pt has recurrent CP or dyspnea         Tonio Valle MD  5/24/2017, 4:05 PM

## 2017-05-24 NOTE — PROGRESS NOTES
0800 - received pt in room from [facility]. Pt resting in bed. Dr. Theodore Medley at bedside, reviewed orders. Pt AO. Pain rated 4/10.    1040 - assessment completed. Pt resting in bed. AOx4. VSS. Pt c/o pain 4/10 to left side. Surgical site is c/d/i. Pt on 4L O2, received orders to wean off.     1145 - aguirre d/c'd, pt tolerated well. Pt then assisted up to chair. No distress noted. 1230 - pt still up in chair, tolerating well. No distress noted. Wife at bedside. 1450 - observed pt ambulating in hallway with his wife, use of walker. No distress noted. Pt does not c/o SOB at this time.      1800 - pt able to void 400cc

## 2017-05-24 NOTE — PROGRESS NOTES
RRR for chest pain. Pain mid sternum 4/10 . Non radiating. Associated SOB onset since early today on suppl O2 sating 88%. Patient denies fever, nausea, vomiting. EKG STAT NSR no ST change. Nitro SL x 1 with mild change. CXR, Troponin, GI cocktail stat, proBNP . Continue Nitro prn and Morphine. ECHO 5/2017- Reviewed. EF 60%. Mild diastolic dysfunction. Continue current management.    Monisha Trujillo MD   10:58 PM

## 2017-05-25 ENCOUNTER — APPOINTMENT (OUTPATIENT)
Dept: GENERAL RADIOLOGY | Age: 57
DRG: 657 | End: 2017-05-25
Attending: INTERNAL MEDICINE
Payer: COMMERCIAL

## 2017-05-25 LAB
ANION GAP BLD CALC-SCNC: 10 MMOL/L (ref 3–18)
BUN SERPL-MCNC: 14 MG/DL (ref 7–18)
BUN/CREAT SERPL: 9 (ref 12–20)
CALCIUM SERPL-MCNC: 8.5 MG/DL (ref 8.5–10.1)
CHLORIDE SERPL-SCNC: 102 MMOL/L (ref 100–108)
CO2 SERPL-SCNC: 25 MMOL/L (ref 21–32)
CREAT SERPL-MCNC: 1.5 MG/DL (ref 0.6–1.3)
ERYTHROCYTE [DISTWIDTH] IN BLOOD BY AUTOMATED COUNT: 13.7 % (ref 11.6–14.5)
GLUCOSE SERPL-MCNC: 87 MG/DL (ref 74–99)
HCT VFR BLD AUTO: 45.2 % (ref 36–48)
HGB BLD-MCNC: 15 G/DL (ref 13–16)
MCH RBC QN AUTO: 29.2 PG (ref 24–34)
MCHC RBC AUTO-ENTMCNC: 33.2 G/DL (ref 31–37)
MCV RBC AUTO: 87.9 FL (ref 74–97)
PLATELET # BLD AUTO: 204 K/UL (ref 135–420)
PMV BLD AUTO: 9.8 FL (ref 9.2–11.8)
POTASSIUM SERPL-SCNC: 4.3 MMOL/L (ref 3.5–5.5)
RBC # BLD AUTO: 5.14 M/UL (ref 4.7–5.5)
SODIUM SERPL-SCNC: 137 MMOL/L (ref 136–145)
WBC # BLD AUTO: 9.4 K/UL (ref 4.6–13.2)

## 2017-05-25 PROCEDURE — 36415 COLL VENOUS BLD VENIPUNCTURE: CPT | Performed by: UROLOGY

## 2017-05-25 PROCEDURE — 74011250637 HC RX REV CODE- 250/637: Performed by: UROLOGY

## 2017-05-25 PROCEDURE — 74011250636 HC RX REV CODE- 250/636: Performed by: INTERNAL MEDICINE

## 2017-05-25 PROCEDURE — 74011250636 HC RX REV CODE- 250/636: Performed by: UROLOGY

## 2017-05-25 PROCEDURE — 80048 BASIC METABOLIC PNL TOTAL CA: CPT | Performed by: UROLOGY

## 2017-05-25 PROCEDURE — 77030020263 HC SOL INJ SOD CL0.9% LFCR 1000ML

## 2017-05-25 PROCEDURE — 65270000029 HC RM PRIVATE

## 2017-05-25 PROCEDURE — 85027 COMPLETE CBC AUTOMATED: CPT | Performed by: UROLOGY

## 2017-05-25 PROCEDURE — 71010 XR CHEST PORT: CPT

## 2017-05-25 RX ORDER — FUROSEMIDE 10 MG/ML
40 INJECTION INTRAMUSCULAR; INTRAVENOUS ONCE
Status: COMPLETED | OUTPATIENT
Start: 2017-05-25 | End: 2017-05-25

## 2017-05-25 RX ADMIN — FUROSEMIDE 40 MG: 10 INJECTION, SOLUTION INTRAMUSCULAR; INTRAVENOUS at 17:02

## 2017-05-25 RX ADMIN — OXYCODONE HYDROCHLORIDE AND ACETAMINOPHEN 2 TABLET: 5; 325 TABLET ORAL at 06:50

## 2017-05-25 RX ADMIN — SODIUM CHLORIDE 125 ML/HR: 900 INJECTION, SOLUTION INTRAVENOUS at 03:39

## 2017-05-25 RX ADMIN — DOCUSATE SODIUM 100 MG: 100 CAPSULE, LIQUID FILLED ORAL at 16:58

## 2017-05-25 RX ADMIN — HEPARIN SODIUM 5000 UNITS: 5000 INJECTION, SOLUTION INTRAVENOUS; SUBCUTANEOUS at 16:58

## 2017-05-25 RX ADMIN — OXYCODONE HYDROCHLORIDE AND ACETAMINOPHEN 2 TABLET: 5; 325 TABLET ORAL at 12:52

## 2017-05-25 RX ADMIN — Medication 10 ML: at 17:01

## 2017-05-25 RX ADMIN — OXYCODONE HYDROCHLORIDE AND ACETAMINOPHEN 2 TABLET: 5; 325 TABLET ORAL at 19:01

## 2017-05-25 RX ADMIN — DOCUSATE SODIUM 100 MG: 100 CAPSULE, LIQUID FILLED ORAL at 09:48

## 2017-05-25 RX ADMIN — HEPARIN SODIUM 5000 UNITS: 5000 INJECTION, SOLUTION INTRAVENOUS; SUBCUTANEOUS at 22:12

## 2017-05-25 RX ADMIN — HEPARIN SODIUM 5000 UNITS: 5000 INJECTION, SOLUTION INTRAVENOUS; SUBCUTANEOUS at 06:49

## 2017-05-25 RX ADMIN — ONDANSETRON 4 MG: 2 INJECTION INTRAMUSCULAR; INTRAVENOUS at 06:55

## 2017-05-25 RX ADMIN — DOCUSATE SODIUM 100 MG: 100 CAPSULE, LIQUID FILLED ORAL at 22:11

## 2017-05-25 RX ADMIN — Medication 10 ML: at 06:56

## 2017-05-25 NOTE — PROGRESS NOTES
[facility]  Hospitalist Division    Daily progress Note    Patient: Lorena Poon MRN: [id number]  CSN: [id number]    YOB: 1960  Age: 49 y.o.   Sex: male    DOA: 5/22/2017 LOS:  LOS: 3 days                    Subjective:     CC: chest pain     Feeling better, still has some ESCUDERO and low sats while sleeping     Objective:      Visit Vitals    BP (!) 139/94 (BP 1 Location: Right arm, BP Patient Position: Sitting)    Pulse 87    Temp 98.5 °F (36.9 °C)    Resp 18    Ht 6' 6\" (1.981 m)    Wt (!) 186.9 kg (412 lb)    SpO2 92%    BMI 47.61 kg/m2       Physical Exam:    NC/AT  NO JVD,TMG  S1/S2 RRR  Basilar rales, NO WHEEZING  NT,ND, NABS  +1  EDEMA  CN INTACT, MS EQUAL ALL 4 EXT        Intake and Output:  Current Shift:     Last three shifts:  05/23 1901 - 05/25 0700  In: 6502.9 [P.O.:480; I.V.:6022.9]  Out: 4100 [Urine:4100]    Recent Results (from the past 24 hour(s))   METABOLIC PANEL, BASIC    Collection Time: 05/25/17  4:50 AM   Result Value Ref Range    Sodium 137 136 - 145 mmol/L    Potassium 4.3 3.5 - 5.5 mmol/L    Chloride 102 100 - 108 mmol/L    CO2 25 21 - 32 mmol/L    Anion gap 10 3.0 - 18 mmol/L    Glucose 87 74 - 99 mg/dL    BUN 14 7.0 - 18 MG/DL    Creatinine 1.50 (H) 0.6 - 1.3 MG/DL    BUN/Creatinine ratio 9 (L) 12 - 20      GFR est AA 59 (L) >60 ml/min/1.73m2    GFR est non-AA 49 (L) >60 ml/min/1.73m2    Calcium 8.5 8.5 - 10.1 MG/DL   CBC W/O DIFF    Collection Time: 05/25/17  4:50 AM   Result Value Ref Range    WBC 9.4 4.6 - 13.2 K/uL    RBC 5.14 4.70 - 5.50 M/uL    HGB 15.0 13.0 - 16.0 g/dL    HCT 45.2 36.0 - 48.0 %    MCV 87.9 74.0 - 97.0 FL    MCH 29.2 24.0 - 34.0 PG    MCHC 33.2 31.0 - 37.0 g/dL    RDW 13.7 11.6 - 14.5 %    PLATELET 713 083 - 073 K/uL    MPV 9.8 9.2 - 11.8 FL         Current Facility-Administered Medications:     furosemide (LASIX) injection 40 mg, 40 mg, IntraVENous, ONCE, Lilibeth Jackson MD    nitroglycerin (NITROSTAT) tablet 0.4 mg, 0.4 mg, SubLINGual, PRN, Sinai Ram MD, 0.4 mg at 05/23/17 2243    sodium chloride (NS) flush 5-10 mL, 5-10 mL, IntraVENous, Q8H, Tato Blanco MD, 10 mL at 05/25/17 0656    sodium chloride (NS) flush 5-10 mL, 5-10 mL, IntraVENous, PRN, Tato Blanco MD    acetaminophen (TYLENOL) tablet 650 mg, 650 mg, Oral, Q4H PRN, Tato Blanco MD    oxyCODONE-acetaminophen (PERCOCET) 5-325 mg per tablet 1-2 Tab, 1-2 Tab, Oral, Q4H PRN, Flores Lackey MD, 2 Tab at 05/25/17 1252    morphine injection 4 mg, 4 mg, IntraVENous, Q4H PRN, Flores Lackey MD, 4 mg at 05/22/17 1515    naloxone (NARCAN) injection 0.4 mg, 0.4 mg, IntraVENous, PRN, Tato Blanco MD    ondansetron (ZOFRAN) injection 4 mg, 4 mg, IntraVENous, Q4H PRN, Tato Blanco MD, 4 mg at 05/25/17 0655    diphenhydrAMINE (BENADRYL) injection 12.5 mg, 12.5 mg, IntraVENous, Q4H PRN, Tato Blanco MD    docusate sodium (COLACE) capsule 100 mg, 100 mg, Oral, TID, Flores Lackey MD, 100 mg at 05/25/17 0948    heparin (porcine) injection 5,000 Units, 5,000 Units, SubCUTAneous, Q8H, Tato Blanco MD, 5,000 Units at 05/25/17 0132    zolpidem (AMBIEN) tablet 5 mg, 5 mg, Oral, QHS PRN, Flores aLckey MD    Lab Results   Component Value Date/Time    Glucose 87 05/25/2017 04:50 AM    Glucose 87 05/24/2017 04:40 AM    Glucose 91 05/23/2017 04:00 AM        Assessment/Plan     Active Problems:    Renal mass (4/12/2017)      S/p left nephrectomy   Atypical chest pain, most likely MS in origin   Low risk for PE, pt is very active and works two jobs  LE PVL is negative  High risk for nephropathy with CKD and solitary kidney  Dyspnea with exertion, ?  Volume overload Vs bronchitis  I/O  +3700 since admission   CXR reviewed, awaiting final read  Lasix 40 mg x 1 dose   BMP in AM          Felipe Veliz MD  5/25/2017, 4:05 PM

## 2017-05-25 NOTE — PROGRESS NOTES
Progress Note    Patient: Bolivar Neely MRN: [id number]  SSN: [id number]    YOB: 1960  Age: 49 y.o. Sex: male      Admit Date: 5/22/2017    LOS: 3 days     Subjective:     No events over night. Had to go back on O2. Stats 94% on 4L.       Objective:     Vitals:    05/24/17 1555 05/24/17 1950 05/25/17 0107 05/25/17 0446   BP: 129/87 115/79 147/90 (!) 134/91   Pulse: 86 84 84 79   Resp: 18 18 18 18   Temp: 98.5 °F (36.9 °C) 99.1 °F (37.3 °C) 98.1 °F (36.7 °C) 98.2 °F (36.8 °C)   SpO2: 97% 92% 92% 92%   Weight:       Height:            Intake and Output:  Current Shift:    Last three shifts: 05/23 1901 - 05/25 0700  In: 6502.9 [P.O.:480; I.V.:6022.9]  Out: 4100 [Urine:4100]    Physical Exam:   GENERAL: alert, cooperative, no distress, appears stated age  LUNG: unlabored  HEART: regular rate and rhythm  ABDOMEN: Soft, Non tender  EXTREMITIES:  extremities normal, atraumatic, no cyanosis or edema  SKIN: Normal.  PSYCHIATRIC: non focal  INCISION: clean, dry, intact    Lab/Data Review:    Lab Results   Component Value Date/Time    WBC 9.4 05/25/2017 04:50 AM    HGB 15.0 05/25/2017 04:50 AM    HCT 45.2 05/25/2017 04:50 AM    PLATELET 458 87/45/5345 04:50 AM    MCV 87.9 05/25/2017 04:50 AM       Lab Results   Component Value Date/Time    Sodium 137 05/25/2017 04:50 AM    Potassium 4.3 05/25/2017 04:50 AM    Chloride 102 05/25/2017 04:50 AM    CO2 25 05/25/2017 04:50 AM    Anion gap 10 05/25/2017 04:50 AM    Glucose 87 05/25/2017 04:50 AM    BUN 14 05/25/2017 04:50 AM    Creatinine 1.50 05/25/2017 04:50 AM    BUN/Creatinine ratio 9 05/25/2017 04:50 AM    GFR est AA 59 05/25/2017 04:50 AM    GFR est non-AA 49 05/25/2017 04:50 AM    Calcium 8.5 05/25/2017 04:50 AM         POD 3 s/p Left Robotic Nephrectomy    Assessment:     Active Problems:    Renal mass (4/12/2017)        Plan:     VQ scan unable to be done  LE  negative  Continous pulse ox    Void trial passed  UOOB to chair  IS  Advance diet  Pain control  Heparin SQ  If unable to wean O2 today will prep for CTA    Signed By: Carie Fernandez MD     May 25, 2017

## 2017-05-25 NOTE — PROGRESS NOTES
Discharge plan is to return home. Oxygen weaning proceeding, case management following.   Natalia Padron RN

## 2017-05-25 NOTE — PROGRESS NOTES
1935Bedside and Verbal shift change report given to Anitha Dotson  (oncoming nurse) by Kareem Vicente RN (offgoing nurse). Report included the following information SBAR, Kardex, [facility] and Recent Results    2030 shift assessment done,patient resting soundly in bed with no  signs of distress noted,incision intact,with no signs of bleeding or infection noted ,call bell within reach,wife at bedside    2128 due medications including prn pain med given,well tolerated with no signs  of distress noted    0045 shift reassessment done ,patient sleeping soundly in bed,eith no changes noted from previous assessment ,will continue to monitor    0320 patient desating into the 80's 2l/min oxygen given ,patient's spo2 now at 96 will continue to monitor    0445 shift assessment ,no distress  noted,patient sleeping soundly with 2l/min oxygen still intact    0656 due meds including prn pain medication given,patient complaining of nausea,prn zofran also given,bed in low position    0740 Bedside and Verbal shift change report given to Kareem Vicente (oncoming nurse) , by Anitha Dotson RN (offgoing nurse).  Report included the following information SBAR, Kardex, [facility] and Recent Results

## 2017-05-25 NOTE — PROGRESS NOTES
18 - received pt in room from Rae Lock RN. Pt resting in bed. Wife at bedside. Pain rated 0/10 at rest. No distress notes. 0652 - assessment completed. Pt is up to chair. AOx4. VSS. Dressings are c/d/i. Pt does not c/o SOB at this time, had an episode of desat overnight. Call bell at chair side. Wife in room. No distress noted. 1145 - observed pt resting in bed, during IDR pt was encouraged to ambulate more often. Advised to let primary nurse of any addl episodes of SOB. 1337 - observed pt sleeping. Wife at bedside. 1615 - observed pt ambulating in hallway with his wife, tolerating well.

## 2017-05-25 NOTE — PROGRESS NOTES
completed follow up visit with  Patient and family member today in room 2214 at around 36 or so.and a Spiritual assessment of patient was done. Patient reported that he seems to be doing ok today even though the patients wife was covering him up with another blanket. Patient talked about seeing another  earlier in the week. Glad to have this service available.    Chaplains will continue to follow and will provide pastoral care on an as needed/requested basis    Chaplain Sergei Styles   Board Certified [address] [place] [address]   [phone]

## 2017-05-26 VITALS
OXYGEN SATURATION: 91 % | SYSTOLIC BLOOD PRESSURE: 122 MMHG | HEART RATE: 83 BPM | DIASTOLIC BLOOD PRESSURE: 81 MMHG | BODY MASS INDEX: 36.45 KG/M2 | RESPIRATION RATE: 20 BRPM | HEIGHT: 78 IN | WEIGHT: 315 LBS | TEMPERATURE: 98.6 F

## 2017-05-26 LAB
ANION GAP BLD CALC-SCNC: 8 MMOL/L (ref 3–18)
BUN SERPL-MCNC: 17 MG/DL (ref 7–18)
BUN/CREAT SERPL: 10 (ref 12–20)
CALCIUM SERPL-MCNC: 8.8 MG/DL (ref 8.5–10.1)
CHLORIDE SERPL-SCNC: 101 MMOL/L (ref 100–108)
CO2 SERPL-SCNC: 26 MMOL/L (ref 21–32)
CREAT SERPL-MCNC: 1.63 MG/DL (ref 0.6–1.3)
ERYTHROCYTE [DISTWIDTH] IN BLOOD BY AUTOMATED COUNT: 13.6 % (ref 11.6–14.5)
GLUCOSE SERPL-MCNC: 89 MG/DL (ref 74–99)
HCT VFR BLD AUTO: 46.5 % (ref 36–48)
HGB BLD-MCNC: 15.5 G/DL (ref 13–16)
MCH RBC QN AUTO: 28.9 PG (ref 24–34)
MCHC RBC AUTO-ENTMCNC: 33.3 G/DL (ref 31–37)
MCV RBC AUTO: 86.8 FL (ref 74–97)
PLATELET # BLD AUTO: 241 K/UL (ref 135–420)
PMV BLD AUTO: 9.8 FL (ref 9.2–11.8)
POTASSIUM SERPL-SCNC: 4.6 MMOL/L (ref 3.5–5.5)
RBC # BLD AUTO: 5.36 M/UL (ref 4.7–5.5)
SODIUM SERPL-SCNC: 135 MMOL/L (ref 136–145)
WBC # BLD AUTO: 9.4 K/UL (ref 4.6–13.2)

## 2017-05-26 PROCEDURE — 97116 GAIT TRAINING THERAPY: CPT

## 2017-05-26 PROCEDURE — 74011250637 HC RX REV CODE- 250/637: Performed by: UROLOGY

## 2017-05-26 PROCEDURE — 97161 PT EVAL LOW COMPLEX 20 MIN: CPT

## 2017-05-26 PROCEDURE — 85027 COMPLETE CBC AUTOMATED: CPT | Performed by: UROLOGY

## 2017-05-26 PROCEDURE — 36415 COLL VENOUS BLD VENIPUNCTURE: CPT | Performed by: UROLOGY

## 2017-05-26 PROCEDURE — 97165 OT EVAL LOW COMPLEX 30 MIN: CPT

## 2017-05-26 PROCEDURE — 97530 THERAPEUTIC ACTIVITIES: CPT

## 2017-05-26 PROCEDURE — 80048 BASIC METABOLIC PNL TOTAL CA: CPT | Performed by: UROLOGY

## 2017-05-26 PROCEDURE — 74011250636 HC RX REV CODE- 250/636: Performed by: UROLOGY

## 2017-05-26 RX ADMIN — OXYCODONE HYDROCHLORIDE AND ACETAMINOPHEN 1 TABLET: 5; 325 TABLET ORAL at 10:42

## 2017-05-26 RX ADMIN — HEPARIN SODIUM 5000 UNITS: 5000 INJECTION, SOLUTION INTRAVENOUS; SUBCUTANEOUS at 06:24

## 2017-05-26 RX ADMIN — DOCUSATE SODIUM 100 MG: 100 CAPSULE, LIQUID FILLED ORAL at 09:43

## 2017-05-26 RX ADMIN — OXYCODONE HYDROCHLORIDE AND ACETAMINOPHEN 2 TABLET: 5; 325 TABLET ORAL at 06:25

## 2017-05-26 NOTE — DISCHARGE INSTRUCTIONS
DISCHARGE SUMMARY from Nurse    The following personal items are in your possession at time of discharge:    Dental Appliances: None  Visual Aid: None     Home Medications: None  Jewelry: None  Clothing: Shirt, Socks, Undergarments, Footwear, Pants  Other Valuables: None             PATIENT INSTRUCTIONS:    After general anesthesia or intravenous sedation, for 24 hours or while taking prescription Narcotics:  · Limit your activities  · Do not drive and operate hazardous machinery  · Do not make important personal or business decisions  · Do  not drink alcoholic beverages  · If you have not urinated within 8 hours after discharge, please contact your surgeon on call. Report the following to your surgeon:  · Excessive pain, swelling, redness or odor of or around the surgical area  · Temperature over 100.5  · Nausea and vomiting lasting longer than 4 hours or if unable to take medications  · Any signs of decreased circulation or nerve impairment to extremity: change in color, persistent  numbness, tingling, coldness or increase pain  · Any questions        What to do at Home:  Recommended activity: Activity as tolerated and no driving for today. If you experience any of the following symptoms: increased pain , nausea and vomiting, please follow up with  [facility]. *  Please give a list of your current medications to your Primary Care Provider. *  Please update this list whenever your medications are discontinued, doses are      changed, or new medications (including over-the-counter products) are added. *  Please carry medication information at all times in case of emergency situations. These are general instructions for a healthy lifestyle:    No smoking/ No tobacco products/ Avoid exposure to second hand smoke    Surgeon General's Warning:  Quitting smoking now greatly reduces serious risk to your health.     Obesity, smoking, and sedentary lifestyle greatly increases your risk for illness    A healthy diet, regular physical exercise & weight monitoring are important for maintaining a healthy lifestyle    You may be retaining fluid if you have a history of heart failure or if you experience any of the following symptoms:  Weight gain of 3 pounds or more overnight or 5 pounds in a week, increased swelling in our hands or feet or shortness of breath while lying flat in bed. Please call your doctor as soon as you notice any of these symptoms; do not wait until your next office visit. Recognize signs and symptoms of STROKE:    F-face looks uneven    A-arms unable to move or move unevenly    S-speech slurred or non-existent    T-time-call 911 as soon as signs and symptoms begin-DO NOT go       Back to bed or wait to see if you get better-TIME IS BRAIN. Warning Signs of HEART ATTACK     Call 911 if you have these symptoms:   Chest discomfort. Most heart attacks involve discomfort in the center of the chest that lasts more than a few minutes, or that goes away and comes back. It can feel like uncomfortable pressure, squeezing, fullness, or pain.  Discomfort in other areas of the upper body. Symptoms can include pain or discomfort in one or both arms, the back, neck, jaw, or stomach.  Shortness of breath with or without chest discomfort.  Other signs may include breaking out in a cold sweat, nausea, or lightheadedness. Don't wait more than five minutes to call 911 - MINUTES MATTER! Fast action can save your life. Calling 911 is almost always the fastest way to get lifesaving treatment. Emergency Medical Services staff can begin treatment when they arrive -- up to an hour sooner than if someone gets to the hospital by car. The discharge information has been reviewed with the patient. The patient verbalized understanding. Discharge medications reviewed with the patient and appropriate educational materials and side effects teaching were provided.   Patient armband removed and shredded. MyChart Activation    Thank you for requesting access to [facility]. Please follow the instructions below to securely access and download your online medical record. [facility] allows you to send messages to your doctor, view your test results, renew your prescriptions, schedule appointments, and more. How Do I Sign Up? 1. In your internet browser, go to [email or web address]  2. Click on the First Time User? Click Here link in the Sign In box. You will be redirect to the New Member Sign Up page. 3. Enter your [facility] Access Code exactly as it appears below. You will not need to use this code after youve completed the sign-up process. If you do not sign up before the expiration date, you must request a new code. [facility] Access Code: [email or web address]  Expires: 2017 12:02 PM (This is the date your [facility] access code will )    4. Enter the last four digits of your Social Security Number (xxxx) and Date of Birth (mm/dd/yyyy) as indicated and click Submit. You will be taken to the next sign-up page. 5. Create a [facility] ID. This will be your [facility] login ID and cannot be changed, so think of one that is secure and easy to remember. 6. Create a [facility] password. You can change your password at any time. 7. Enter your Password Reset Question and Answer. This can be used at a later time if you forget your password. 8. Enter your e-mail address. You will receive e-mail notification when new information is available in [address]. 9. Click Sign Up. You can now view and download portions of your medical record. 10. Click the Download Summary menu link to download a portable copy of your medical information. Additional Information    If you have questions, please visit the Frequently Asked Questions section of the [facility] website at [email or web address] Remember, [facility] is NOT to be used for urgent needs. For medical emergencies, dial 911.

## 2017-05-26 NOTE — PROGRESS NOTES
OT order received and chart reviewed. 812: 1st attempt for OT evaluation; pt preparing to ambulate with PT; requesting to eat breakfast after PT session. Pt reports having difficulty with LB dressing; will follow up for OT evaluation; will provide AE for LB dressing.     Thank you for the referral.    Abhijeet Peguero MS OTR/L  Office Ext: [phone]  Pager: [phone]

## 2017-05-26 NOTE — ROUTINE PROCESS
Bedside and Verbal shift change report given to [address] [place] [address] (oncoming nurse) by Ailyn Michelle RN    (offgoing nurse). Report included the following information SBAR, Kardex, Intake/Output, MAR and Recent Results.

## 2017-05-26 NOTE — ROUTINE PROCESS
Bedside and Verbal shift change report given to Daryle Sieving, RN (oncoming nurse) by Ernesto Forman RN(offgoing nurse). Report included the following information SBAR, Kardex and MAR.

## 2017-05-26 NOTE — DISCHARGE SUMMARY
Discharge Summary    Patient: Nadja Gonzalez               Sex: male          DOA: 5/22/2017         YOB: 1960      Age:  49 y.o.        LOS:  LOS: 4 days                Admit Date: 5/22/2017    Discharge Date: 5/26/2017    Admission Diagnoses: renal mass  n28.89; Renal mass    Discharge Diagnoses:    Problem List as of 5/26/2017  Date Reviewed: 5/22/2017          Codes Class Noted - Resolved    Renal mass ICD-10-CM: N28.89  ICD-9-CM: 593.9  4/12/2017 - Present              Discharge Condition: Stable    Hospital Course: RRT for cp. Troponin negative, LE dopplers negative. Likely atypical See chart for further detials    Consults: None    Significant Diagnostic Studies: None    Discharge Medications:     Current Discharge Medication List      START taking these medications    Details   oxyCODONE-acetaminophen (PERCOCET) 5-325 mg per tablet Take 1-2 Tabs by mouth every four (4) hours as needed for Pain. Max Daily Amount: 12 Tabs. Qty: 30 Tab, Refills: 0      docusate sodium (COLACE) 100 mg capsule Take 1 Cap by mouth two (2) times a day for 90 days. Qty: 30 Cap, Refills: 0         CONTINUE these medications which have NOT CHANGED    Details   acetaminophen (TYLENOL) 325 mg tablet 650 mg every four (4) hours as needed for Pain. Progress Note    Patient: Nadja Gonzalez MRN: [id number]  SSN: [id number]    YOB: 1960  Age: 49 y.o. Sex: male      Admit Date: 5/22/2017    LOS: 4 days     Subjective:     No events overnight, no complaints.  No O2 requirements    Objective:     Vitals:    05/25/17 1517 05/25/17 2054 05/26/17 0030 05/26/17 0337   BP: 120/80 128/84 117/79 109/74   Pulse: 73 89 79 71   Resp: 18 18 18 18   Temp: 98.2 °F (36.8 °C) 98.6 °F (37 °C) 98.3 °F (36.8 °C) 98.3 °F (36.8 °C)   SpO2: 92% 90% 94% 94%   Weight:       Height:            Intake and Output:  Current Shift:    Last three shifts: 05/24 1901 - 05/26 0700  In: 1080 [P.O.:1080]  Out: 2050 [Urine:2050]    Physical Exam:   GENERAL: alert, cooperative, no distress, appears stated age  LUNG: unlabored  HEART: regular rate and rhythm  ABDOMEN: Soft, Non tender  EXTREMITIES:  extremities normal, atraumatic, no cyanosis or edema  SKIN: Normal.  PSYCHIATRIC: non focal  INCISION: clean, dry, intact    Lab/Data Review:    Lab Results   Component Value Date/Time    WBC 9.4 05/26/2017 06:00 AM    HGB 15.5 05/26/2017 06:00 AM    HCT 46.5 05/26/2017 06:00 AM    PLATELET 289 15/77/1887 06:00 AM    MCV 86.8 05/26/2017 06:00 AM       Lab Results   Component Value Date/Time    Sodium 137 05/25/2017 04:50 AM    Potassium 4.3 05/25/2017 04:50 AM    Chloride 102 05/25/2017 04:50 AM    CO2 25 05/25/2017 04:50 AM    Anion gap 10 05/25/2017 04:50 AM    Glucose 87 05/25/2017 04:50 AM    BUN 14 05/25/2017 04:50 AM    Creatinine 1.50 05/25/2017 04:50 AM    BUN/Creatinine ratio 9 05/25/2017 04:50 AM    GFR est AA 59 05/25/2017 04:50 AM    GFR est non-AA 49 05/25/2017 04:50 AM    Calcium 8.5 05/25/2017 04:50 AM         POD 4 s/p L RA Lap Nx    Assessment:     Active Problems:    Renal mass (4/12/2017)        Plan:     No O2 overnight  Pain controlled  Path reviewed  Home today with po pain medication    Signed By: Tiago Hernandez MD     May 26, 2017              Activity: As tolerated    Diet: Regular    Wound Care:  May shower    Follow-up: As scheduled

## 2017-05-26 NOTE — PROGRESS NOTES
Problem: Mobility Impaired (Adult and Pediatric)  Goal: *Acute Goals and Plan of Care (Insert Text)  Physical Therapy Goals  Initiated 5/26/2017 and to be accomplished within 1 day(s)  1. Patient will move from supine to sit and sit to supine , scoot up and down and roll side to side in bed with modified independence. 2. Patient will transfer from bed to chair and chair to bed with modified independence using the least restrictive device. 3. Patient will ambulate with modified independence for 70 feet with bariatric RW. .   5. Patient will ascend/descend 3 stairs with 2 handrail(s) with supervision/set-up. PHYSICAL THERAPY EVALUATION     Patient: Mayur Ellis (29 y.o. male)  Date: 5/26/2017  Primary Diagnosis: renal mass  n28.89  Renal mass  Procedure(s) (LRB):  davinci cystoscopy  left NEPHRECTOMY  ROBOTIC ASSISTED (Left) 4 Days Post-Op   Precautions:   Fall  Check P02 during and after gait/activity      ASSESSMENT :  Based on the objective data described below, the patient presents with decreased overall activity tolerance, unsteady gait w/ swaying and balance deficits. .     Patient will benefit from skilled intervention to address the above impairments.   Patients rehabilitation potential is considered to be Good  Factors which may influence rehabilitation potential include:   [ ]         None noted  [ ]         Mental ability/status  [ ]         Medical condition  [ ]         Home/family situation and support systems  [ ]         Safety awareness  [X]         Pain tolerance/management  [ ]         Other:        PLAN :  Recommendations and Planned Interventions:  [ ]           Bed Mobility Training             [ ]    Neuromuscular Re-Education  [X]           Transfer Training                   [ ]    Orthotic/Prosthetic Training  [X]           Gait Training                          [ ]    Modalities  [X]           Therapeutic Exercises          [ ]    Edema Management/Control  [X]           Therapeutic Activities            [X]    Patient and Family Training/Education  [ ]           Other (comment):     Frequency/Duration: Patient will be followed by physical therapy 1-2 times per day/4-7 days per week to address goals. Discharge Recommendations: Home Health  Further Equipment Recommendations for Discharge: rolling walker bariatric       SUBJECTIVE:   Patient stated I would like to eat breakfast.      OBJECTIVE DATA SUMMARY:       Past Medical History:   Diagnosis Date    Cancer ([address]       KIDNEY    Ill-defined condition       slightly enlarged heart     Ill-defined condition       2 herniated discs back      Past Surgical History:   Procedure Laterality Date    HX TONSILLECTOMY         CHILDHOOD     Barriers to Learning/Limitations: None  Compensate with: visual, verbal, tactile, kinesthetic cues/model  GCODES(GP):  Prior Level of Function/Home Situation: pt. Was indep prior to surgery. Reports hip pain prior to sx. Home Situation  Home Environment: Private residence  # Steps to Enter: 3  Rails to Enter: Yes  Hand Rails : Bilateral  One/Two Story Residence: Two story, live on 1st floor  Living Alone: No  Support Systems: Family member(s)  Patient Expects to be Discharged to[de-identified] Private residence  Current DME Used/Available at Home: None  Critical Behavior:  Neurologic State: Alert  Orientation Level: Oriented X4        Psychosocial  Family  Behaviors: Calm  Visitor Behaviors: Cooperative     Family  Behaviors: Calm           Strength:    Strength: Within functional limits                    Tone & Sensation:   Tone: Normal              Sensation: Intact               Range Of Motion:  AROM: Within functional limits                       Functional Mobility:  Bed Mobility:  Rolling: Supervision; Additional time;Assist x1  Supine to Sit: Supervision; Additional time;Assist x1     Scooting: Independent  Transfers:  Sit to Stand: Supervision; Additional time  Stand to Sit: Supervision        Bed to Chair: Supervision              Balance:   Sitting: Intact  Standing: Impaired;Pull to stand; With support  Standing - Static: Good  Standing - Dynamic : Fair  Ambulation/Gait Training:  Distance (ft):  (60)  Assistive Device: Other (comment) (Hand held/ rail occasional)  Ambulation - Level of Assistance: Stand-by asssistance;Contact guard assistance     Gait Description (WDL): Exceptions to WDL  Gait Abnormalities: Antalgic;Decreased step clearance (on R)        Base of Support: Narrowed     Speed/Nuzhat: Delayed; Fluctuations  Step Length: Left shortened                       Pain:  Pain Scale 1: Numeric (0 - 10)  Pain Intensity 1: 4  Pain Location 1: Abdomen  Pain Orientation 1: Left  Pain Description 1: Aching  Pain Intervention(s) 1: Medication (see MAR)  Activity Tolerance:   Fair; SOB noted post gait, pt. Educated on activity pacing, deep breathing and safety. Pt. Would benefit from using a RW to return home to help with his balance, activity endurance and overall safety with gait due to swaying and impaired endurance. Please refer to the flowsheet for vital signs taken during this treatment. After treatment:   [X]         Patient left in no apparent distress sitting up in chair  [ ]         Patient left in no apparent distress in bed  [X]         Call bell left within reach  [X]         Nursing notified  [X]         Caregiver present  [ ]         Bed alarm activated      COMMUNICATION/EDUCATION:   [X]         Fall prevention education was provided and the patient/caregiver indicated understanding. [X]         Patient/family have participated as able in goal setting and plan of care. [ ]         Patient/family agree to work toward stated goals and plan of care. [ ]         Patient understands intent and goals of therapy, but is neutral about his/her participation. [ ]         Patient is unable to participate in goal setting and plan of care.      Thank you for this referral.  Ady Jefferson, PT   PT evaluation: 21 mins. Low complexity evaluation

## 2017-05-26 NOTE — PROGRESS NOTES
Received pt in bed HOB elevated. patient alert and oriented X 4. Pt has noted  5 lap sites to abdomen dry and intact. Pt denies pain at this time. SCD in place and ICS to 2000. Pt has IV Hep lock to left hand # 20  Flushed and patent. He denies SOb, chest pain or N,V. Monitoring ongoing. 0030 Pt resting quietly in bed no s/s of acute distress. Pt has wife at bed side. Pain 0/10 on pain scale. Monitoring ongoing    2215 Pt received PO medication and Heparin SQ. Pt . Monitoring ongoing. 0030 Pt resting in bed family at bedside. No s/s of acute distress monitoring ongoing    0400 Pt denies pain is watching TV with family. Monitoring ongoing  0545 PRN pain medication given pain 5/10. Monitoring ongoing.

## 2017-05-26 NOTE — PROGRESS NOTES
Care Management Interventions  PCP Verified by CM: Yes  Palliative Care Consult (Criteria: CHF and RRAT>21): No  Reason for No Palliative Care Consult: Patient declined palliative services at this time  Mode of Transport at Discharge: Other (see comment)  Transition of Care Consult (CM Consult):  Other  MyChart Signup: No  Discharge Durable Medical Equipment: No  Physical Therapy Consult: No  Occupational Therapy Consult: No  Speech Therapy Consult: No  Current Support Network: Lives with Spouse  Confirm Follow Up Transport: Family  Plan discussed with Pt/Family/Caregiver: Yes  Freedom of Choice Offered: Yes  Discharge Location  Discharge Placement: Home

## 2017-05-26 NOTE — PROGRESS NOTES
Problem: Self Care Deficits Care Plan (Adult)  Goal: *Acute Goals and Plan of Care (Insert Text)  Occupational Therapy Goals  Initiated 5/26/2017 within 7 day(s). 1. Patient will perform grooming tasks while standing with supervision for safety. 2. Patient will perform lower body dressing utilizing adaptive strategies, prn.  3. Patient will perform functional task in standing for 5 minutes with supervision for balance to increase activity tolerance for ADLs. 4. Patient will perform toilet transfers with modified independence. 5. Patient will perform all aspects of toileting with independence. 6. Patient will participate in upper extremity therapeutic exercise/activities with supervision/set-up for 8 minutes to maintain strength of BUEs for ADLs. 7. Patient will utilize energy conservation techniques during functional activities with minimal verbal cues. Outcome: Progressing Towards Goal  OCCUPATIONAL THERAPY EVALUATION     Patient: Ramona Tejada (30 y.o. male)  Date: 5/26/2017  Primary Diagnosis: renal mass  n28.89  Renal mass  Procedure(s) (LRB):  davinci cystoscopy  left NEPHRECTOMY  ROBOTIC ASSISTED (Left) 4 Days Post-Op   Precautions:  Fall      ASSESSMENT :  Based on the objective data described below, the patient presents with impairments with regard to overall independence in ADLs secondary to recent procedure and subsequent abdominal pain. Pt up in chair on arrival. Full AROM/strength of BUEs. Stand-by assist for functional transfer from chair with RW in preparation for toilet transfer. Pt engaged in dynamic reaching activity while seated (while donning/doffing L sock utilizing cross-legged method) with good dynamic sitting balance noted. Pt provided with reacher, as he reports he has difficulty donning underwear/pants due to his height. Pt/spouse educated on fall precautions, home safety, and adaptive strategies to facilitate independence in ADLs; they verbalized understanding.  Pt left up in chair with needs within reach. Recommend HH upon d/c to maximize pt safety and independence in ADLs. Patient will benefit from skilled intervention to address the above impairments. Patients rehabilitation potential is considered to be Good  Factors which may influence rehabilitation potential include:   [ ]             None noted  [ ]             Mental ability/status  [X]             Medical condition  [ ]             Home/family situation and support systems  [ ]             Safety awareness  [ ]             Pain tolerance/management  [ ]             Other:           PLAN :  Recommendations and Planned Interventions:  [X]               Self Care Training                  [X]        Therapeutic Activities  [X]               Functional Mobility Training    [ ]        Cognitive Retraining  [X]               Therapeutic Exercises           [X]        Endurance Activities  [X]               Balance Training                   [ ]        Neuromuscular Re-Education  [ ]               Visual/Perceptual Training     [X]   Home Safety Training  [X]               Patient Education                 [X]        Family Training/Education  [ ]               Other (comment):     Frequency/Duration: Patient will be followed by occupational therapy 3 times a week to address goals. Discharge Recommendations: Home Health  Further Equipment Recommendations for Discharge: bedside commode, shower chair       SUBJECTIVE:   Patient stated I don't know that I'll need home health. \"      OBJECTIVE DATA SUMMARY:       Past Medical History:   Diagnosis Date    Cancer ([facility] [place] [address]       KIDNEY    Ill-defined condition       slightly enlarged heart     Ill-defined condition       2 herniated discs back      Past Surgical History:   Procedure Laterality Date    HX TONSILLECTOMY         CHILDHOOD     Barriers to Learning/Limitations: None  Compensate with: visual, verbal, tactile, kinesthetic cues/model     GCODES:  Self Care  Current  CJ= 20-39%   Goal  CI= 1-19%. The severity rating is based on the Other Functional Assessment, MMT, ROM     Eval Complexity: History: LOW Complexity : Brief history review ; Examination: LOW Complexity : 1-3 performance deficits relating to physical, cognitive , or psychosocial skils that result in activity limitations and / or participation restrictions ; Decision Making:MEDIUM Complexity : Patient may present with comorbidities that affect occupational performnce. Miniml to moderate modification of tasks or assistance (eg, physical or verbal ) with assesment(s) is necessary to enable patient to complete evaluation      Prior Level of Function/Home Situation:Pt was independent with basic self care tasks and functional mobility PTA. Home Situation  Home Environment: Private residence  # Steps to Enter: 3  Rails to Enter: Yes  Hand Rails : Bilateral  One/Two Story Residence: Two story, live on 1st floor  Living Alone: No  Support Systems: Family member(s)  Patient Expects to be Discharged to[de-identified] Private residence  Current DME Used/Available at Home: None  Tub or Shower Type: Tub/Shower combination (no grab bars or shower chair)  [X]  Right hand dominant          [ ]  Left hand dominant     Cognitive/Behavioral Status:  Neurologic State: Alert  Orientation Level: Oriented X4  Cognition: Appropriate decision making; Appropriate for age attention/concentration; Follows commands  Safety/Judgement: Awareness of environment; Fall prevention      Skin: Intact (BUEs)  Edema: None noted (BUEs)     Vision/Perceptual:    Acuity: Within Defined Limits       Coordination:  Coordination: Within functional limits (BUEs)  Fine Motor Skills-Upper: Right Intact; Left Intact    Gross Motor Skills-Upper: Right Intact; Left Intact      Balance:  Sitting: Intact  Standing: Impaired; With support  Standing - Static: Good  Standing - Dynamic : Fair     Strength:  Strength:  Within functional limits (BUEs: 5/5)     Tone & Sensation:  Tone: Normal (BUEs)  Sensation: Intact (BUEs)     Range of Motion:  AROM: Within functional limits (BUEs: full shoulder flexion/elbow flexion)     Functional Mobility and Transfers for ADLs:  Bed Mobility:  Rolling: Supervision; Additional time;Assist x1  Supine to Sit:  (not assessed; up in chair on arrival)  Sit to Supine:  (not assessed; pt left up in chair)  Scooting: Independent      Transfers:  Sit to Stand: Stand-by asssistance  Bed to Chair: Supervision              Tub Transfer:  (not assessed)                ADL Assessment:  Feeding: Independent  Oral Facial Hygiene/Grooming: Independent  Bathing: Minimum assistance  Upper Body Dressing: Independent  Lower Body Dressing: Stand-by assistance  Toileting: Supervision (for safety)     Cognitive Retraining  Safety/Judgement: Awareness of environment; Fall prevention     Therapeutic Activity:  Stand-by assist for functional transfer from chair with RW in preparation for toilet transfer. Pt engaged in dynamic reaching activity while seated (while donning/doffing L sock) with good dynamic sitting balance noted. Pain:  Pre treatment pain level: 3/10  Post treatment pain level: 3/10  Pain Scale 1: Numeric (0 - 10)  Pain Intensity 1: 3  Pain Location 1: Abdomen  Pain Orientation 1: Left  Pain Description 1: Aching     Activity Tolerance:  Fair+  Please refer to the flowsheet for vital signs taken during this treatment. After treatment:   [X] Patient left in no apparent distress sitting up in chair  [ ] Patient left in no apparent distress in bed  [X] Call bell left within reach  [X] Nursing notified  [X] Caregiver present  [ ] Bed alarm activated      COMMUNICATION/EDUCATION: Patient/spouse educated on role of OT, POC, and home safety. They verbalized understanding.   [X] Home safety education was provided and the patient/caregiver indicated understanding. [X] Patient/family have participated as able in goal setting and plan of care.   [X] Patient/family agree to work toward stated goals and plan of care. [ ] Patient understands intent and goals of therapy, but is neutral about his/her participation. [ ] Patient is unable to participate in goal setting and plan of care.      Thank you for this referral.     Heather Geiger MS OTR/L  Time Calculation: 26 mins

## 2017-05-27 NOTE — PROGRESS NOTES
Received bedside verbal report from [facility]. Patient lying in bed with no complaints, bed at the lowest position, call bell within reach, white board updated. 0920 Patient's assessment done, is resting in recliner, will continue to monitor him. 5029 Patient wants to get pain, not due at this time, will give when it's time for it. I have reviewed discharge instructions with the patient. The patient verbalized understanding.

## 2017-07-03 NOTE — ANCILLARY DISCHARGE INSTRUCTIONS
[facility]  Discharge Phone Call       After-Care Discharge Phone Call Questions: NO ANSWER    Were you able to get your prescriptions filled? Comment:      [] Yes  []No    Comment if answer is \"No\"   Are you taking your medication(s) as your doctor ordered? Do you understand the purpose of your medications? Comment:    [] Yes  []No    Comment if answer is \"No\"   Are you taking any other medications that are not on the list?  Comment:      [] Yes  []No    Comment if answer is \"Yes\"   Do you have any questions about your medications? Are you aware of potential side effects? Comment:    [] Yes  []No    Comment if answer is \"Yes\"   Did you make your follow-up appointments (if the hospital did not do this before  discharge)? Comment:    [] Yes  []No    Comment if answer is \"No\"   Is there any reason you might not be able to keep your follow-up appointments? Comment:     [] Yes  []No    Comment if answer is \"Yes\"   Do you have any questions about your care plan? Are you aware of what health problems to be alert for? Comment:    [] Yes  []No    Comment if answer is \"Yes\"   Do you have a good understanding of how you should manage your health? Comment:    [] Yes  []No    Comment if answer is \"Yes\"   Do you know which symptoms to watch for that would mean you would need to call your doctor right away? Comment:      [] Yes  []No    Comment if answer is \"No\"   Do you have any questions about the follow up process or any instructions that we have provided? Comment:    [] Yes  []No    Comment if answer is \"Yes\"   Did staff take your preferences into account?         [] Yes  []No    Comment if answer is \"Yes\"

## 2019-09-26 PROBLEM — E55.9 VITAMIN D DEFICIENCY: Status: ACTIVE | Noted: 2017-09-29

## 2019-09-26 PROBLEM — K76.0 HEPATIC STEATOSIS: Status: ACTIVE | Noted: 2017-05-16

## 2019-09-26 PROBLEM — E78.5 DYSLIPIDEMIA: Status: ACTIVE | Noted: 2018-07-03

## 2019-09-26 PROBLEM — R06.09 DYSPNEA ON EXERTION: Status: ACTIVE | Noted: 2019-09-26

## 2019-09-26 PROBLEM — G47.33 OSA (OBSTRUCTIVE SLEEP APNEA): Status: ACTIVE | Noted: 2019-08-23

## 2019-09-26 PROBLEM — I51.9 MILD DIASTOLIC DYSFUNCTION: Status: ACTIVE | Noted: 2017-05-18

## 2019-09-26 PROBLEM — E11.9 TYPE 2 DIABETES MELLITUS WITHOUT COMPLICATION, WITHOUT LONG-TERM CURRENT USE OF INSULIN (HCC): Status: ACTIVE | Noted: 2018-05-24

## 2019-09-26 PROBLEM — Z91.14 NONCOMPLIANCE WITH CPAP TREATMENT: Status: ACTIVE | Noted: 2019-08-23

## 2019-09-26 PROBLEM — Z90.5 HISTORY OF UNILATERAL NEPHRECTOMY: Status: ACTIVE | Noted: 2018-04-26

## 2019-09-26 PROBLEM — I10 ESSENTIAL HYPERTENSION: Status: ACTIVE | Noted: 2019-05-02

## 2019-09-26 PROBLEM — E29.1 MALE HYPOGONADISM: Status: ACTIVE | Noted: 2018-07-03

## 2019-09-26 PROBLEM — K57.30 DIVERTICULOSIS OF LARGE INTESTINE WITHOUT HEMORRHAGE: Status: ACTIVE | Noted: 2017-05-16

## 2020-06-09 NOTE — PROGRESS NOTES
Progress Note    Patient: Chio Polk MRN: [id number]  SSN: [id number]    YOB: 1960  Age: 49 y.o. Sex: male      Admit Date: 5/22/2017    LOS: 2 days     Subjective:     Patient with RRT last night for chest pain. Troponin negatvie. EKG NSR. Stats 94% on 4L.       Objective:     Vitals:    05/23/17 2249 05/23/17 2302 05/24/17 0040 05/24/17 0554   BP: 106/75 129/75 128/81 (!) 152/96   Pulse: (!) 109 98 83 86   Resp:   16 16   Temp: 99.1 °F (37.3 °C)  98.5 °F (36.9 °C) 98.4 °F (36.9 °C)   SpO2: (!) 89% 91% 93% 95%   Weight:       Height:            Intake and Output:  Current Shift:    Last three shifts: 05/22 1901 - 05/24 0700  In: 1860 [P.O.:1860]  Out: 4580 [Urine:2850]    Physical Exam:   GENERAL: alert, cooperative, no distress, appears stated age  LUNG: unlabored  HEART: regular rate and rhythm  ABDOMEN: Soft, Non tender  EXTREMITIES:  extremities normal, atraumatic, no cyanosis or edema  SKIN: Normal.  PSYCHIATRIC: non focal  INCISION: clean, dry, intact    Lab/Data Review:    Lab Results   Component Value Date/Time    WBC 9.9 05/24/2017 04:40 AM    HGB 15.0 05/24/2017 04:40 AM    HCT 45.6 05/24/2017 04:40 AM    PLATELET 365 53/91/1155 04:40 AM    MCV 88.0 05/24/2017 04:40 AM       Lab Results   Component Value Date/Time    Sodium 138 05/24/2017 04:40 AM    Potassium 4.6 05/24/2017 04:40 AM    Chloride 105 05/24/2017 04:40 AM    CO2 24 05/24/2017 04:40 AM    Anion gap 9 05/24/2017 04:40 AM    Glucose 87 05/24/2017 04:40 AM    BUN 13 05/24/2017 04:40 AM    Creatinine 1.47 05/24/2017 04:40 AM    BUN/Creatinine ratio 9 05/24/2017 04:40 AM    GFR est AA >60 05/24/2017 04:40 AM    GFR est non-AA 50 05/24/2017 04:40 AM    Calcium 8.4 05/24/2017 04:40 AM         POD 2 s/p Left Robotic Nephrectomy    Assessment:     Active Problems:    Renal mass (4/12/2017)        Plan:     VQ scan  LE US  Continous pulse ox  Void trial  UOOB to chair  IS  Clears for now  Pain control  Heparin SQ    Signed By: Torey Blanchard MD     May 24, 2017 Name band;

## (undated) DEVICE — GOWN,SIRUS,FABRNF,XL,20/CS: Brand: MEDLINE

## (undated) DEVICE — STAPLER SKIN H3.9MM WIRE DIA0.58MM CRWN 6.9MM 35 STPL FIX

## (undated) DEVICE — TRAY CATH OD16FR SIL URIN M STATLOK STBL DEV SURSTP

## (undated) DEVICE — COLUMN DRAPE

## (undated) DEVICE — SOL INJ L R 1000ML BG --

## (undated) DEVICE — SUTURE ABSORBABLE BRAIDED 2-0 CT-1 27 IN UD VICRYL J259H

## (undated) DEVICE — DERMABOND SKIN ADH 0.7ML -- DERMABOND ADVANCED 12/BX

## (undated) DEVICE — SEAL UNIV 5-8MM DISP BX/10 -- DA VINCI XI - SNGL USE

## (undated) DEVICE — CLIP SUT ENDOSCP F/2-0/3-0/4-0 -- LAPRA-TY

## (undated) DEVICE — CLIP APPLIER WITH CLIP LOGIC TECHNOLOGY: Brand: ENDO CLIP III

## (undated) DEVICE — (D)PREP SKN CHLRAPRP APPL 26ML -- CONVERT TO ITEM 371833

## (undated) DEVICE — SLEEVE TRCR L100MM DIA5MM UNIV STBL FOR BLDELSS DIL TIP

## (undated) DEVICE — REM POLYHESIVE ADULT PATIENT RETURN ELECTRODE: Brand: VALLEYLAB

## (undated) DEVICE — SOL IRR NACL 0.9% 500ML POUR --

## (undated) DEVICE — PACKING 8004007 NEURAY 200PK 13X76MM: Brand: NEURAY ®

## (undated) DEVICE — KENDALL SCD EXPRESS SLEEVES, KNEE LENGTH, MEDIUM: Brand: KENDALL SCD

## (undated) DEVICE — BLADELESS OBTURATOR: Brand: WECK VISTA

## (undated) DEVICE — BLADE ASSEMB CLP HAIR FINE --

## (undated) DEVICE — CARTRIDGE CLP LIG HEMLOK GRN --

## (undated) DEVICE — NEEDLE HYPO 25GA L1.5IN BVL ORIENTED ECLIPSE

## (undated) DEVICE — BODY ALIGNER: Brand: DEROYAL

## (undated) DEVICE — SHEET,DRAPE,70X100,STERILE: Brand: MEDLINE

## (undated) DEVICE — APPLICATOR SEAL FLOSEAL 5MM+ --

## (undated) DEVICE — FLOSEAL MATRIX IS INDICATED IN SURGICAL PROCEDURES (OTHER THAN IN OPHTHALMIC) AS AN ADJUNCT TO HEMOSTASIS WHEN CONTROL OF BLEEDING BY LIGATURE OR CONVENTIONALPROCEDURES IS INEFFECTIVE OR IMPRACTICAL.: Brand: FLOSEAL HEMOSTATIC MATRIX

## (undated) DEVICE — STERILE POLYISOPRENE POWDER-FREE SURGICAL GLOVES: Brand: PROTEXIS

## (undated) DEVICE — VISUALIZATION SYSTEM: Brand: CLEARIFY

## (undated) DEVICE — VESSEL LOOPS,MAXI, BLUE: Brand: DEVON

## (undated) DEVICE — DISPOSABLE SUCTION/IRRIGATOR TUBE SET WITH TIP: Brand: AHTO

## (undated) DEVICE — BLADELESS OBTURATOR

## (undated) DEVICE — RELOAD STPL L60MM H1-2.6MM MESENTERY THN TISS WHT 6 ROW

## (undated) DEVICE — SUTURE VCRL SZ 0 L36IN ABSRB UD L36MM CT-1 1/2 CIR J946H

## (undated) DEVICE — ELECTRO LUBE IS A SINGLE PATIENT USE DEVICE THAT IS INTENDED TO BE USED ON ELECTROSURGICAL ELECTRODES TO REDUCE STICKING.: Brand: KEY SURGICAL ELECTRO LUBE

## (undated) DEVICE — SUTURE VCRL SZ 3-0 L27IN ABSRB UD L26MM SH 1/2 CIR J416H

## (undated) DEVICE — CLIP LIG ABSRB HEM-LOK LG PUR --

## (undated) DEVICE — DEVON™ KNEE AND BODY STRAP 60" X 3" (1.5 M X 7.6 CM): Brand: DEVON

## (undated) DEVICE — ARM DRAPE

## (undated) DEVICE — SUTURE PERMAHAND SZ 2-0 L12X18IN NONABSORBABLE BLK SILK A185H

## (undated) DEVICE — SUTURE PDS II SZ 1 L96IN ABSRB VLT TP-1 L65MM 1/2 CIR Z880G

## (undated) DEVICE — Device

## (undated) DEVICE — INSTRMT SET WND CLSR SUT PASS --

## (undated) DEVICE — BLADELESS OPTICAL TROCAR WITH FIXATION CANNULA: Brand: VERSAPORT

## (undated) DEVICE — SUTURE MCRYL SZ 4-0 L27IN ABSRB UD SH L26MM 1/2 CIR Y415H

## (undated) DEVICE — POUCH SPEC RETRV SHFT 15MM 13X23CM GRN POLYUR DBL RNG HNDL

## (undated) DEVICE — SYR 10ML CTRL LR LCK NSAF LF --

## (undated) DEVICE — 3M™ DURAPORE™ SURGICAL TAPE 1538-0, 1/2 INCH X 10 YARD (1,25CM X 9,1M), 24 ROLLS/BOX: Brand: 3M™ DURAPORE™